# Patient Record
Sex: FEMALE | Race: BLACK OR AFRICAN AMERICAN | NOT HISPANIC OR LATINO | Employment: FULL TIME | URBAN - METROPOLITAN AREA
[De-identification: names, ages, dates, MRNs, and addresses within clinical notes are randomized per-mention and may not be internally consistent; named-entity substitution may affect disease eponyms.]

---

## 2017-07-13 ENCOUNTER — HISTORICAL (OUTPATIENT)
Dept: INTERNAL MEDICINE | Facility: CLINIC | Age: 47
End: 2017-07-13

## 2018-02-27 ENCOUNTER — HISTORICAL (OUTPATIENT)
Dept: INTERNAL MEDICINE | Facility: CLINIC | Age: 48
End: 2018-02-27

## 2018-02-27 LAB
BUN SERPL-MCNC: 10 MG/DL (ref 7–18)
CALCIUM SERPL-MCNC: 9.2 MG/DL (ref 8.5–10.1)
CHLORIDE SERPL-SCNC: 104 MMOL/L (ref 98–107)
CHOLEST SERPL-MCNC: 162 MG/DL
CHOLEST/HDLC SERPL: 3.4 {RATIO} (ref 0–4.4)
CO2 SERPL-SCNC: 29 MMOL/L (ref 21–32)
CREAT SERPL-MCNC: 0.9 MG/DL (ref 0.6–1.3)
DEPRECATED CALCIDIOL+CALCIFEROL SERPL-MC: 24.53 NG/ML (ref 30–80)
EST. AVERAGE GLUCOSE BLD GHB EST-MCNC: 126 MG/DL
GLUCOSE SERPL-MCNC: 107 MG/DL (ref 74–106)
HBA1C MFR BLD: 6 % (ref 4.2–6.3)
HDLC SERPL-MCNC: 48 MG/DL
LDLC SERPL CALC-MCNC: 101 MG/DL (ref 0–130)
POTASSIUM SERPL-SCNC: 3.8 MMOL/L (ref 3.5–5.1)
SODIUM SERPL-SCNC: 140 MMOL/L (ref 136–145)
TRIGL SERPL-MCNC: 66 MG/DL
VLDLC SERPL CALC-MCNC: 13 MG/DL

## 2019-02-19 ENCOUNTER — HISTORICAL (OUTPATIENT)
Dept: RADIOLOGY | Facility: HOSPITAL | Age: 49
End: 2019-02-19

## 2020-08-07 ENCOUNTER — HISTORICAL (OUTPATIENT)
Dept: RADIOLOGY | Facility: HOSPITAL | Age: 50
End: 2020-08-07

## 2020-08-07 LAB
DEPRECATED CALCIDIOL+CALCIFEROL SERPL-MC: 32.2 NG/ML (ref 6.6–49.9)
ERYTHROCYTE [DISTWIDTH] IN BLOOD BY AUTOMATED COUNT: 15.7 % (ref 11.5–17)
EST. AVERAGE GLUCOSE BLD GHB EST-MCNC: 114 MG/DL
ESTRADIOL SERPL HS-MCNC: 42 PG/ML
FSH SERPL-ACNC: 5.3 MIU/ML
GLUCOSE SERPL-MCNC: 91 MG/DL (ref 74–100)
HBA1C MFR BLD: 5.6 %
HCT VFR BLD AUTO: 39.5 % (ref 37–47)
HGB BLD-MCNC: 12.2 GM/DL (ref 12–16)
IRON SERPL-MCNC: 38 UG/DL (ref 50–170)
MCH RBC QN AUTO: 24.3 PG (ref 27–31)
MCHC RBC AUTO-ENTMCNC: 30.9 GM/DL (ref 33–36)
MCV RBC AUTO: 78.5 FL (ref 80–94)
PLATELET # BLD AUTO: 289 X10(3)/MCL (ref 130–400)
PMV BLD AUTO: 11.5 FL (ref 9.4–12.4)
PROLACTIN LEVEL (OHS): 7.6 NG/ML (ref 5.18–26.53)
RBC # BLD AUTO: 5.03 X10(6)/MCL (ref 4.2–5.4)
TSH SERPL-ACNC: 2.07 UIU/ML (ref 0.35–4.94)
WBC # SPEC AUTO: 5.5 X10(3)/MCL (ref 4.5–11.5)

## 2021-01-04 ENCOUNTER — HISTORICAL (OUTPATIENT)
Dept: ADMINISTRATIVE | Facility: HOSPITAL | Age: 51
End: 2021-01-04

## 2021-01-04 LAB
ABS NEUT (OLG): 2.99 X10(3)/MCL (ref 2.1–9.2)
APPEARANCE, UA: CLEAR
BACTERIA SPEC CULT: NORMAL /HPF
BASOPHILS # BLD AUTO: 0 X10(3)/MCL (ref 0–0.2)
BASOPHILS NFR BLD AUTO: 0 %
BILIRUB UR QL STRIP: NEGATIVE
CHOLEST SERPL-MCNC: 209 MG/DL
CHOLEST/HDLC SERPL: 4 {RATIO} (ref 0–5)
COLOR UR: YELLOW
DEPRECATED CALCIDIOL+CALCIFEROL SERPL-MC: 40.2 NG/ML (ref 30–80)
EOSINOPHIL # BLD AUTO: 0.2 X10(3)/MCL (ref 0–0.9)
EOSINOPHIL NFR BLD AUTO: 4 %
ERYTHROCYTE [DISTWIDTH] IN BLOOD BY AUTOMATED COUNT: 17.5 % (ref 11.5–17)
EST. AVERAGE GLUCOSE BLD GHB EST-MCNC: 116.9 MG/DL
GLUCOSE (UA): NEGATIVE
HBA1C MFR BLD: 5.7 %
HCT VFR BLD AUTO: 43.3 % (ref 37–47)
HDLC SERPL-MCNC: 52 MG/DL (ref 35–60)
HGB BLD-MCNC: 12.9 GM/DL (ref 12–16)
HGB UR QL STRIP: NEGATIVE
IRON SERPL-MCNC: 69 UG/DL (ref 50–170)
KETONES UR QL STRIP: NEGATIVE
LDLC SERPL CALC-MCNC: 138 MG/DL (ref 50–140)
LEUKOCYTE ESTERASE UR QL STRIP: NEGATIVE
LYMPHOCYTES # BLD AUTO: 1.7 X10(3)/MCL (ref 0.6–4.6)
LYMPHOCYTES NFR BLD AUTO: 30 %
MCH RBC QN AUTO: 23.8 PG (ref 27–31)
MCHC RBC AUTO-ENTMCNC: 29.8 GM/DL (ref 33–36)
MCV RBC AUTO: 80 FL (ref 80–94)
MONOCYTES # BLD AUTO: 0.5 X10(3)/MCL (ref 0.1–1.3)
MONOCYTES NFR BLD AUTO: 10 %
NEUTROPHILS # BLD AUTO: 2.99 X10(3)/MCL (ref 2.1–9.2)
NEUTROPHILS NFR BLD AUTO: 55 %
NITRITE UR QL STRIP: NEGATIVE
PH UR STRIP: 5.5 [PH] (ref 5–9)
PLATELET # BLD AUTO: 281 X10(3)/MCL (ref 130–400)
PMV BLD AUTO: 11.7 FL (ref 9.4–12.4)
PROT UR QL STRIP: NEGATIVE
RBC # BLD AUTO: 5.41 X10(6)/MCL (ref 4.2–5.4)
RBC #/AREA URNS HPF: NORMAL /HPF (ref 0–2)
SP GR UR STRIP: 1.02 (ref 1–1.03)
SQUAMOUS EPITHELIAL, UA: NORMAL /HPF (ref 0–4)
TRIGL SERPL-MCNC: 96 MG/DL (ref 37–140)
TSH SERPL-ACNC: 3.52 UIU/ML (ref 0.35–4.94)
UROBILINOGEN UR STRIP-ACNC: 0.2
VLDLC SERPL CALC-MCNC: 19 MG/DL
WBC # SPEC AUTO: 5.5 X10(3)/MCL (ref 4.5–11.5)
WBC #/AREA URNS HPF: NORMAL /HPF (ref 0–3)

## 2021-06-29 ENCOUNTER — HISTORICAL (OUTPATIENT)
Dept: ADMINISTRATIVE | Facility: HOSPITAL | Age: 51
End: 2021-06-29

## 2021-06-29 LAB
ABS NEUT (OLG): 3.23 X10(3)/MCL (ref 2.1–9.2)
ALBUMIN SERPL-MCNC: 3 GM/DL (ref 3.5–5)
ALBUMIN/GLOB SERPL: 0.8 RATIO (ref 1.1–2)
ALP SERPL-CCNC: 98 UNIT/L (ref 40–150)
ALT SERPL-CCNC: 12 UNIT/L (ref 0–55)
AST SERPL-CCNC: 11 UNIT/L (ref 5–34)
BASOPHILS # BLD AUTO: 0 X10(3)/MCL (ref 0–0.2)
BASOPHILS NFR BLD AUTO: 0 %
BILIRUB SERPL-MCNC: 0.5 MG/DL
BILIRUBIN DIRECT+TOT PNL SERPL-MCNC: 0.2 MG/DL (ref 0–0.5)
BILIRUBIN DIRECT+TOT PNL SERPL-MCNC: 0.3 MG/DL (ref 0–0.8)
BUN SERPL-MCNC: 10.4 MG/DL (ref 7–18.7)
CALCIUM SERPL-MCNC: 9.4 MG/DL (ref 8.4–10.2)
CHLORIDE SERPL-SCNC: 102 MMOL/L (ref 98–107)
CHOLEST SERPL-MCNC: 161 MG/DL
CHOLEST/HDLC SERPL: 4 {RATIO} (ref 0–5)
CO2 SERPL-SCNC: 29 MMOL/L (ref 22–29)
CREAT SERPL-MCNC: 0.8 MG/DL (ref 0.55–1.02)
EOSINOPHIL # BLD AUTO: 0.1 X10(3)/MCL (ref 0–0.9)
EOSINOPHIL NFR BLD AUTO: 1 %
ERYTHROCYTE [DISTWIDTH] IN BLOOD BY AUTOMATED COUNT: 15.7 % (ref 11.5–17)
GLOBULIN SER-MCNC: 3.8 GM/DL (ref 2.4–3.5)
GLUCOSE SERPL-MCNC: 92 MG/DL (ref 74–100)
HCT VFR BLD AUTO: 40.4 % (ref 37–47)
HDLC SERPL-MCNC: 43 MG/DL (ref 35–60)
HGB BLD-MCNC: 12.8 GM/DL (ref 12–16)
LDLC SERPL CALC-MCNC: 106 MG/DL (ref 50–140)
LYMPHOCYTES # BLD AUTO: 1.9 X10(3)/MCL (ref 0.6–4.6)
LYMPHOCYTES NFR BLD AUTO: 33 %
MCH RBC QN AUTO: 24.8 PG (ref 27–31)
MCHC RBC AUTO-ENTMCNC: 31.7 GM/DL (ref 33–36)
MCV RBC AUTO: 78.3 FL (ref 80–94)
MONOCYTES # BLD AUTO: 0.5 X10(3)/MCL (ref 0.1–1.3)
MONOCYTES NFR BLD AUTO: 9 %
NEUTROPHILS # BLD AUTO: 3.23 X10(3)/MCL (ref 2.1–9.2)
NEUTROPHILS NFR BLD AUTO: 56 %
PLATELET # BLD AUTO: 296 X10(3)/MCL (ref 130–400)
PMV BLD AUTO: 11 FL (ref 9.4–12.4)
POTASSIUM SERPL-SCNC: 4 MMOL/L (ref 3.5–5.1)
PROT SERPL-MCNC: 6.8 GM/DL (ref 6.4–8.3)
RBC # BLD AUTO: 5.16 X10(6)/MCL (ref 4.2–5.4)
SODIUM SERPL-SCNC: 137 MMOL/L (ref 136–145)
TRIGL SERPL-MCNC: 62 MG/DL (ref 37–140)
VLDLC SERPL CALC-MCNC: 12 MG/DL
WBC # SPEC AUTO: 5.7 X10(3)/MCL (ref 4.5–11.5)

## 2021-10-05 ENCOUNTER — HISTORICAL (OUTPATIENT)
Dept: ADMINISTRATIVE | Facility: HOSPITAL | Age: 51
End: 2021-10-05

## 2021-10-05 LAB
DEPRECATED CALCIDIOL+CALCIFEROL SERPL-MC: 33.3 NG/ML (ref 30–80)
ERYTHROCYTE [DISTWIDTH] IN BLOOD BY AUTOMATED COUNT: 15.8 % (ref 11.5–17)
HCT VFR BLD AUTO: 36.8 % (ref 37–47)
HGB BLD-MCNC: 11.3 GM/DL (ref 12–16)
IRON SERPL-MCNC: 26 UG/DL (ref 50–170)
MCH RBC QN AUTO: 23.7 PG (ref 27–31)
MCHC RBC AUTO-ENTMCNC: 30.7 GM/DL (ref 33–36)
MCV RBC AUTO: 77.3 FL (ref 80–94)
PLATELET # BLD AUTO: 302 X10(3)/MCL (ref 130–400)
PMV BLD AUTO: 11.7 FL (ref 9.4–12.4)
RBC # BLD AUTO: 4.76 X10(6)/MCL (ref 4.2–5.4)
TSH SERPL-ACNC: 3.96 UIU/ML (ref 0.35–4.94)
WBC # SPEC AUTO: 7 X10(3)/MCL (ref 4.5–11.5)

## 2022-04-07 ENCOUNTER — HISTORICAL (OUTPATIENT)
Dept: ADMINISTRATIVE | Facility: HOSPITAL | Age: 52
End: 2022-04-07

## 2022-04-07 LAB
ABS NEUT (OLG): 2.96 (ref 2.1–9.2)
ALBUMIN SERPL-MCNC: 3.2 G/DL (ref 3.5–5)
ALBUMIN/GLOB SERPL: 1.1 {RATIO} (ref 1.1–2)
ALP SERPL-CCNC: 93 U/L (ref 40–150)
ALT SERPL-CCNC: 10 U/L (ref 0–55)
APPEARANCE, UA: CLEAR
AST SERPL-CCNC: 11 U/L (ref 5–34)
BACTERIA SPEC CULT: NORMAL
BASOPHILS # BLD AUTO: 0 10*3/UL (ref 0–0.2)
BASOPHILS NFR BLD AUTO: 1 %
BILIRUB SERPL-MCNC: 0.4 MG/DL
BILIRUB UR QL STRIP: NEGATIVE
BILIRUBIN DIRECT+TOT PNL SERPL-MCNC: 0.2 (ref 0–0.5)
BILIRUBIN DIRECT+TOT PNL SERPL-MCNC: 0.2 (ref 0–0.8)
BUN SERPL-MCNC: 9 MG/DL (ref 7–18.7)
CALCIUM SERPL-MCNC: 9.1 MG/DL (ref 8.7–10.5)
CHLORIDE SERPL-SCNC: 106 MMOL/L (ref 98–107)
CHOLEST SERPL-MCNC: 169 MG/DL
CHOLEST/HDLC SERPL: 4 {RATIO} (ref 0–5)
CO2 SERPL-SCNC: 22 MMOL/L (ref 22–29)
COLOR UR: YELLOW
CREAT SERPL-MCNC: 0.78 MG/DL (ref 0.55–1.02)
DEPRECATED CALCIDIOL+CALCIFEROL SERPL-MC: 30.4 NG/ML (ref 30–80)
EOSINOPHIL # BLD AUTO: 0.1 10*3/UL (ref 0–0.9)
EOSINOPHIL NFR BLD AUTO: 2 %
ERYTHROCYTE [DISTWIDTH] IN BLOOD BY AUTOMATED COUNT: 16.6 % (ref 11.5–17)
EST. AVERAGE GLUCOSE BLD GHB EST-MCNC: 111.2 MG/DL
GLOBULIN SER-MCNC: 3 G/DL (ref 2.4–3.5)
GLUCOSE (UA): NEGATIVE
GLUCOSE SERPL-MCNC: 91 MG/DL (ref 74–100)
HBA1C MFR BLD: 5.5 %
HCT VFR BLD AUTO: 36.2 % (ref 37–47)
HDLC SERPL-MCNC: 38 MG/DL (ref 35–60)
HEMOLYSIS INTERF INDEX SERPL-ACNC: 5
HGB BLD-MCNC: 11.3 G/DL (ref 12–16)
HGB UR QL STRIP: NEGATIVE
ICTERIC INTERF INDEX SERPL-ACNC: 0
KETONES UR QL STRIP: NEGATIVE
LDLC SERPL CALC-MCNC: 114 MG/DL (ref 50–140)
LEUKOCYTE ESTERASE UR QL STRIP: NEGATIVE
LIPEMIC INTERF INDEX SERPL-ACNC: <0
LYMPHOCYTES # BLD AUTO: 1.7 10*3/UL (ref 0.6–4.6)
LYMPHOCYTES NFR BLD AUTO: 32 %
MANUAL DIFF? (OHS): NO
MCH RBC QN AUTO: 23.8 PG (ref 27–31)
MCHC RBC AUTO-ENTMCNC: 31.2 G/DL (ref 33–36)
MCV RBC AUTO: 76.4 FL (ref 80–94)
MONOCYTES # BLD AUTO: 0.6 10*3/UL (ref 0.1–1.3)
MONOCYTES NFR BLD AUTO: 10 %
NEUTROPHILS # BLD AUTO: 2.96 10*3/UL (ref 2.1–9.2)
NEUTROPHILS NFR BLD AUTO: 55 %
NITRITE UR QL STRIP: NEGATIVE
PH UR STRIP: 6.5 [PH] (ref 5–9)
PLATELET # BLD AUTO: 251 10*3/UL (ref 130–400)
PMV BLD AUTO: 12.3 FL (ref 9.4–12.4)
POTASSIUM SERPL-SCNC: 4.2 MMOL/L (ref 3.5–5.1)
PROT SERPL-MCNC: 6.2 G/DL (ref 6.4–8.3)
PROT UR QL STRIP: NEGATIVE
RBC # BLD AUTO: 4.74 10*6/UL (ref 4.2–5.4)
RBC #/AREA URNS HPF: NORMAL /[HPF] (ref 0–2)
SODIUM SERPL-SCNC: 139 MMOL/L (ref 136–145)
SP GR UR STRIP: 1.01 (ref 1–1.03)
SQUAMOUS EPITHELIAL, UA: NORMAL (ref 0–4)
TRIGL SERPL-MCNC: 83 MG/DL (ref 37–140)
TSH SERPL-ACNC: 3.02 M[IU]/L (ref 0.35–4.94)
UA WBC MAN: NORMAL (ref 0–2)
UROBILINOGEN UR STRIP-ACNC: 0.2
VLDLC SERPL CALC-MCNC: 17 MG/DL
WBC # SPEC AUTO: 5.3 10*3/UL (ref 4.5–11.5)

## 2022-04-10 ENCOUNTER — HISTORICAL (OUTPATIENT)
Dept: ADMINISTRATIVE | Facility: HOSPITAL | Age: 52
End: 2022-04-10

## 2022-04-25 VITALS
SYSTOLIC BLOOD PRESSURE: 126 MMHG | HEIGHT: 67 IN | WEIGHT: 293 LBS | OXYGEN SATURATION: 98 % | BODY MASS INDEX: 45.99 KG/M2 | DIASTOLIC BLOOD PRESSURE: 70 MMHG

## 2022-06-03 LAB — NONINV COLON CA DNA+OCC BLD SCRN STL QL: NEGATIVE

## 2023-01-20 ENCOUNTER — TELEPHONE (OUTPATIENT)
Dept: FAMILY MEDICINE | Facility: CLINIC | Age: 53
End: 2023-01-20
Payer: COMMERCIAL

## 2023-01-20 DIAGNOSIS — I10 BENIGN ESSENTIAL HTN: Primary | ICD-10-CM

## 2023-01-20 RX ORDER — HYDROCHLOROTHIAZIDE 25 MG/1
25 TABLET ORAL DAILY
COMMUNITY
Start: 2022-12-22 | End: 2023-01-20 | Stop reason: SDUPTHER

## 2023-01-20 RX ORDER — HYDROCHLOROTHIAZIDE 25 MG/1
25 TABLET ORAL DAILY
Qty: 90 TABLET | Refills: 3 | Status: SHIPPED | OUTPATIENT
Start: 2023-01-20 | End: 2023-01-23 | Stop reason: SDUPTHER

## 2023-01-20 NOTE — TELEPHONE ENCOUNTER
----- Message from Yola Berry sent at 1/20/2023  7:53 AM CST -----  Regarding: Med refill  [facility] pharmacy is requesting a refill on Hydrochlorothiazide 25mg tablets. Qty. 90. Take 1 tablet by mouth daily.

## 2023-01-20 NOTE — TELEPHONE ENCOUNTER
----- Message from Yola Berry sent at 1/20/2023  7:53 AM CST -----  Regarding: Med refill  [facility] pharmacy is requesting a refill on Hydrochlorothiazide 25mg tablets. Qty. 90. Take 1 tablet by mouth daily.

## 2023-01-23 DIAGNOSIS — I10 BENIGN ESSENTIAL HTN: ICD-10-CM

## 2023-01-23 RX ORDER — HYDROCHLOROTHIAZIDE 25 MG/1
25 TABLET ORAL DAILY
Qty: 30 TABLET | Refills: 0 | Status: SHIPPED | OUTPATIENT
Start: 2023-01-23 | End: 2023-06-19 | Stop reason: SDUPTHER

## 2023-05-04 RX ORDER — FLUTICASONE PROPIONATE 50 UG/1
50 POWDER, METERED RESPIRATORY (INHALATION) 2 TIMES DAILY
Qty: 16 EACH | Refills: 5 | Status: SHIPPED | OUTPATIENT
Start: 2023-05-04 | End: 2023-05-08 | Stop reason: SDUPTHER

## 2023-05-04 RX ORDER — FLUTICASONE PROPIONATE 50 UG/1
50 POWDER, METERED RESPIRATORY (INHALATION) 2 TIMES DAILY
COMMUNITY
End: 2023-05-04 | Stop reason: SDUPTHER

## 2023-05-08 ENCOUNTER — PATIENT MESSAGE (OUTPATIENT)
Dept: FAMILY MEDICINE | Facility: CLINIC | Age: 53
End: 2023-05-08
Payer: COMMERCIAL

## 2023-05-08 DIAGNOSIS — T78.40XA ALLERGY, INITIAL ENCOUNTER: Primary | ICD-10-CM

## 2023-05-08 RX ORDER — FLUTICASONE PROPIONATE 50 UG/1
50 POWDER, METERED RESPIRATORY (INHALATION) 2 TIMES DAILY
Qty: 16 EACH | Refills: 5 | Status: SHIPPED | OUTPATIENT
Start: 2023-05-08 | End: 2023-06-06

## 2023-05-08 RX ORDER — FLUTICASONE PROPIONATE 50 MCG
1 SPRAY, SUSPENSION (ML) NASAL 2 TIMES DAILY
COMMUNITY
Start: 2023-04-03 | End: 2023-05-08 | Stop reason: SDUPTHER

## 2023-05-08 RX ORDER — FLUTICASONE PROPIONATE 50 MCG
1 SPRAY, SUSPENSION (ML) NASAL 2 TIMES DAILY
Qty: 16 G | Refills: 11 | Status: SHIPPED | OUTPATIENT
Start: 2023-05-08

## 2023-05-08 NOTE — TELEPHONE ENCOUNTER
----- Message from Trevon Saucedo sent at 5/8/2023 10:09 AM CDT -----  .Type:  Needs Medical Advice    Who Called:  Yudith at [facility]   Symptoms (please be specific):    How long has patient had these symptoms:    Pharmacy name and phone #:    Would the patient rather a call back or a response via [facility]?   Best Call Back Number: [phone]  Additional Information: She has some questions about a script for this patient that was sent to them.

## 2023-05-30 ENCOUNTER — TELEPHONE (OUTPATIENT)
Dept: FAMILY MEDICINE | Facility: CLINIC | Age: 53
End: 2023-05-30
Payer: COMMERCIAL

## 2023-05-30 DIAGNOSIS — Z00.00 WELLNESS EXAMINATION: Primary | ICD-10-CM

## 2023-05-30 NOTE — TELEPHONE ENCOUNTER
----- Message from Nikkie Mcknight sent at 5/30/2023  9:51 AM CDT -----  .Type:  Needs Medical Advice    Who Called: [t  Would the patient rather a call back or a response via [facility]? cb  Best Call Back Number: [phone]  Additional Information: pt asking for her labs to be sent to [facility] please let her know once in

## 2023-06-02 ENCOUNTER — LAB VISIT (OUTPATIENT)
Dept: LAB | Facility: HOSPITAL | Age: 53
End: 2023-06-02
Attending: FAMILY MEDICINE
Payer: COMMERCIAL

## 2023-06-02 DIAGNOSIS — Z00.00 WELLNESS EXAMINATION: ICD-10-CM

## 2023-06-02 LAB
ALBUMIN SERPL-MCNC: 3.3 G/DL (ref 3.5–5)
ALBUMIN/GLOB SERPL: 1 RATIO (ref 1.1–2)
ALP SERPL-CCNC: 92 UNIT/L (ref 40–150)
ALT SERPL-CCNC: 14 UNIT/L (ref 0–55)
APPEARANCE UR: CLEAR
AST SERPL-CCNC: 14 UNIT/L (ref 5–34)
BACTERIA #/AREA URNS AUTO: NORMAL /HPF
BASOPHILS # BLD AUTO: 0.04 X10(3)/MCL
BASOPHILS NFR BLD AUTO: 0.6 %
BILIRUB UR QL STRIP.AUTO: NEGATIVE MG/DL
BILIRUBIN DIRECT+TOT PNL SERPL-MCNC: 0.4 MG/DL
BUN SERPL-MCNC: 14.5 MG/DL (ref 9.8–20.1)
CALCIUM SERPL-MCNC: 9.3 MG/DL (ref 8.4–10.2)
CHLORIDE SERPL-SCNC: 101 MMOL/L (ref 98–107)
CHOLEST SERPL-MCNC: 170 MG/DL
CHOLEST/HDLC SERPL: 4 {RATIO} (ref 0–5)
CO2 SERPL-SCNC: 25 MMOL/L (ref 22–29)
COLOR UR: YELLOW
CREAT SERPL-MCNC: 0.84 MG/DL (ref 0.55–1.02)
EOSINOPHIL # BLD AUTO: 0.08 X10(3)/MCL (ref 0–0.9)
EOSINOPHIL NFR BLD AUTO: 1.2 %
ERYTHROCYTE [DISTWIDTH] IN BLOOD BY AUTOMATED COUNT: 16.3 % (ref 11.5–17)
EST. AVERAGE GLUCOSE BLD GHB EST-MCNC: 116.9 MG/DL
GFR SERPLBLD CREATININE-BSD FMLA CKD-EPI: >60 MLS/MIN/1.73/M2
GLOBULIN SER-MCNC: 3.3 GM/DL (ref 2.4–3.5)
GLUCOSE SERPL-MCNC: 105 MG/DL (ref 74–100)
GLUCOSE UR QL STRIP.AUTO: NEGATIVE MG/DL
HBA1C MFR BLD: 5.7 %
HCT VFR BLD AUTO: 39.7 % (ref 37–47)
HCV AB SERPL QL IA: NONREACTIVE
HDLC SERPL-MCNC: 46 MG/DL (ref 35–60)
HGB BLD-MCNC: 12.7 G/DL (ref 12–16)
HIV 1+2 AB+HIV1 P24 AG SERPL QL IA: NONREACTIVE
IMM GRANULOCYTES # BLD AUTO: 0.04 X10(3)/MCL (ref 0–0.04)
IMM GRANULOCYTES NFR BLD AUTO: 0.6 %
KETONES UR QL STRIP.AUTO: NEGATIVE MG/DL
LDLC SERPL CALC-MCNC: 107 MG/DL (ref 50–140)
LEUKOCYTE ESTERASE UR QL STRIP.AUTO: NEGATIVE UNIT/L
LYMPHOCYTES # BLD AUTO: 2.11 X10(3)/MCL (ref 0.6–4.6)
LYMPHOCYTES NFR BLD AUTO: 32.8 %
MCH RBC QN AUTO: 25 PG (ref 27–31)
MCHC RBC AUTO-ENTMCNC: 32 G/DL (ref 33–36)
MCV RBC AUTO: 78.3 FL (ref 80–94)
MONOCYTES # BLD AUTO: 0.57 X10(3)/MCL (ref 0.1–1.3)
MONOCYTES NFR BLD AUTO: 8.9 %
NEUTROPHILS # BLD AUTO: 3.59 X10(3)/MCL (ref 2.1–9.2)
NEUTROPHILS NFR BLD AUTO: 55.9 %
NITRITE UR QL STRIP.AUTO: NEGATIVE
NRBC BLD AUTO-RTO: 0 %
PH UR STRIP.AUTO: 7.5 [PH]
PLATELET # BLD AUTO: 291 X10(3)/MCL (ref 130–400)
PMV BLD AUTO: 11.2 FL (ref 7.4–10.4)
POTASSIUM SERPL-SCNC: 4.1 MMOL/L (ref 3.5–5.1)
PROT SERPL-MCNC: 6.6 GM/DL (ref 6.4–8.3)
PROT UR QL STRIP.AUTO: NEGATIVE MG/DL
RBC # BLD AUTO: 5.07 X10(6)/MCL (ref 4.2–5.4)
RBC #/AREA URNS AUTO: <5 /HPF
RBC UR QL AUTO: NEGATIVE UNIT/L
SODIUM SERPL-SCNC: 136 MMOL/L (ref 136–145)
SP GR UR STRIP.AUTO: 1.01 (ref 1–1.03)
SQUAMOUS #/AREA URNS AUTO: <5 /HPF
TRIGL SERPL-MCNC: 83 MG/DL (ref 37–140)
TSH SERPL-ACNC: 3.94 UIU/ML (ref 0.35–4.94)
UROBILINOGEN UR STRIP-ACNC: 0.2 MG/DL
VLDLC SERPL CALC-MCNC: 17 MG/DL
WBC # SPEC AUTO: 6.43 X10(3)/MCL (ref 4.5–11.5)
WBC #/AREA URNS AUTO: <5 /HPF

## 2023-06-02 PROCEDURE — 36415 COLL VENOUS BLD VENIPUNCTURE: CPT

## 2023-06-02 PROCEDURE — 80061 LIPID PANEL: CPT

## 2023-06-02 PROCEDURE — 85025 COMPLETE CBC W/AUTO DIFF WBC: CPT

## 2023-06-02 PROCEDURE — 87389 HIV-1 AG W/HIV-1&-2 AB AG IA: CPT

## 2023-06-02 PROCEDURE — 83036 HEMOGLOBIN GLYCOSYLATED A1C: CPT

## 2023-06-02 PROCEDURE — 80053 COMPREHEN METABOLIC PANEL: CPT

## 2023-06-02 PROCEDURE — 81001 URINALYSIS AUTO W/SCOPE: CPT

## 2023-06-02 PROCEDURE — 86803 HEPATITIS C AB TEST: CPT

## 2023-06-02 PROCEDURE — 84443 ASSAY THYROID STIM HORMONE: CPT

## 2023-06-06 ENCOUNTER — OFFICE VISIT (OUTPATIENT)
Dept: FAMILY MEDICINE | Facility: CLINIC | Age: 53
End: 2023-06-06
Payer: COMMERCIAL

## 2023-06-06 VITALS
OXYGEN SATURATION: 97 % | WEIGHT: 293 LBS | HEIGHT: 67 IN | DIASTOLIC BLOOD PRESSURE: 84 MMHG | RESPIRATION RATE: 17 BRPM | BODY MASS INDEX: 45.99 KG/M2 | HEART RATE: 91 BPM | SYSTOLIC BLOOD PRESSURE: 136 MMHG

## 2023-06-06 DIAGNOSIS — E66.01 CLASS 3 SEVERE OBESITY DUE TO EXCESS CALORIES WITH SERIOUS COMORBIDITY AND BODY MASS INDEX (BMI) OF 50.0 TO 59.9 IN ADULT: Primary | ICD-10-CM

## 2023-06-06 DIAGNOSIS — Z00.00 WELLNESS EXAMINATION: Primary | ICD-10-CM

## 2023-06-06 DIAGNOSIS — I10 PRIMARY HYPERTENSION: ICD-10-CM

## 2023-06-06 DIAGNOSIS — E66.01 CLASS 3 SEVERE OBESITY DUE TO EXCESS CALORIES WITH SERIOUS COMORBIDITY AND BODY MASS INDEX (BMI) OF 50.0 TO 59.9 IN ADULT: ICD-10-CM

## 2023-06-06 DIAGNOSIS — Z23 NEED FOR DIPHTHERIA-TETANUS-PERTUSSIS (TDAP) VACCINE: ICD-10-CM

## 2023-06-06 DIAGNOSIS — R73.03 PREDIABETES: ICD-10-CM

## 2023-06-06 PROCEDURE — 1159F MED LIST DOCD IN RCRD: CPT | Mod: CPTII,,, | Performed by: FAMILY MEDICINE

## 2023-06-06 PROCEDURE — 99213 PR OFFICE/OUTPT VISIT, EST, LEVL III, 20-29 MIN: ICD-10-PCS | Mod: 25,,, | Performed by: FAMILY MEDICINE

## 2023-06-06 PROCEDURE — 3079F DIAST BP 80-89 MM HG: CPT | Mod: CPTII,,, | Performed by: FAMILY MEDICINE

## 2023-06-06 PROCEDURE — 3008F PR BODY MASS INDEX (BMI) DOCUMENTED: ICD-10-PCS | Mod: CPTII,,, | Performed by: FAMILY MEDICINE

## 2023-06-06 PROCEDURE — 99213 OFFICE O/P EST LOW 20 MIN: CPT | Mod: 25,,, | Performed by: FAMILY MEDICINE

## 2023-06-06 PROCEDURE — 3079F PR MOST RECENT DIASTOLIC BLOOD PRESSURE 80-89 MM HG: ICD-10-PCS | Mod: CPTII,,, | Performed by: FAMILY MEDICINE

## 2023-06-06 PROCEDURE — 90471 PR IMMUNIZ ADMIN,1 SINGLE/COMB VAC/TOXOID: ICD-10-PCS | Mod: ,,, | Performed by: FAMILY MEDICINE

## 2023-06-06 PROCEDURE — 1159F PR MEDICATION LIST DOCUMENTED IN MEDICAL RECORD: ICD-10-PCS | Mod: CPTII,,, | Performed by: FAMILY MEDICINE

## 2023-06-06 PROCEDURE — 90715 PR TDAP VACCINE >7 YO, IM: ICD-10-PCS | Mod: ,,, | Performed by: FAMILY MEDICINE

## 2023-06-06 PROCEDURE — 90715 TDAP VACCINE 7 YRS/> IM: CPT | Mod: ,,, | Performed by: FAMILY MEDICINE

## 2023-06-06 PROCEDURE — 4010F ACE/ARB THERAPY RXD/TAKEN: CPT | Mod: CPTII,,, | Performed by: FAMILY MEDICINE

## 2023-06-06 PROCEDURE — 3008F BODY MASS INDEX DOCD: CPT | Mod: CPTII,,, | Performed by: FAMILY MEDICINE

## 2023-06-06 PROCEDURE — 90471 IMMUNIZATION ADMIN: CPT | Mod: ,,, | Performed by: FAMILY MEDICINE

## 2023-06-06 PROCEDURE — 1160F RVW MEDS BY RX/DR IN RCRD: CPT | Mod: CPTII,,, | Performed by: FAMILY MEDICINE

## 2023-06-06 PROCEDURE — 3075F SYST BP GE 130 - 139MM HG: CPT | Mod: CPTII,,, | Performed by: FAMILY MEDICINE

## 2023-06-06 PROCEDURE — 99396 PR PREVENTIVE VISIT,EST,40-64: ICD-10-PCS | Mod: 25,,, | Performed by: FAMILY MEDICINE

## 2023-06-06 PROCEDURE — 3075F PR MOST RECENT SYSTOLIC BLOOD PRESS GE 130-139MM HG: ICD-10-PCS | Mod: CPTII,,, | Performed by: FAMILY MEDICINE

## 2023-06-06 PROCEDURE — 99396 PREV VISIT EST AGE 40-64: CPT | Mod: 25,,, | Performed by: FAMILY MEDICINE

## 2023-06-06 PROCEDURE — 4010F PR ACE/ARB THEARPY RXD/TAKEN: ICD-10-PCS | Mod: CPTII,,, | Performed by: FAMILY MEDICINE

## 2023-06-06 PROCEDURE — 1160F PR REVIEW ALL MEDS BY PRESCRIBER/CLIN PHARMACIST DOCUMENTED: ICD-10-PCS | Mod: CPTII,,, | Performed by: FAMILY MEDICINE

## 2023-06-06 RX ORDER — DICLOFENAC SODIUM 10 MG/G
4 GEL TOPICAL 2 TIMES DAILY
COMMUNITY
Start: 2023-05-30

## 2023-06-06 RX ORDER — ALBUTEROL SULFATE 90 UG/1
2 AEROSOL, METERED RESPIRATORY (INHALATION) EVERY 6 HOURS PRN
COMMUNITY
Start: 2023-04-03

## 2023-06-06 RX ORDER — DICLOFENAC SODIUM 50 MG/1
50 TABLET, DELAYED RELEASE ORAL 2 TIMES DAILY
COMMUNITY
Start: 2023-05-29

## 2023-06-06 RX ORDER — LISINOPRIL 10 MG/1
10 TABLET ORAL EVERY MORNING
COMMUNITY
Start: 2023-05-17 | End: 2023-06-19 | Stop reason: SDUPTHER

## 2023-06-06 RX ORDER — MAGNESIUM 30 MG
30 TABLET ORAL 2 TIMES DAILY
COMMUNITY

## 2023-06-06 RX ORDER — FERROUS SULFATE TAB 325 MG (65 MG ELEMENTAL FE) 325 (65 FE) MG
325 TAB ORAL EVERY OTHER DAY
COMMUNITY
Start: 2023-04-21

## 2023-06-06 RX ORDER — CHOLECALCIFEROL (VITAMIN D3) 50 MCG
2000 TABLET ORAL DAILY
COMMUNITY

## 2023-06-06 RX ORDER — SPIRONOLACTONE 50 MG/1
50 TABLET, FILM COATED ORAL 2 TIMES DAILY
COMMUNITY
Start: 2023-05-04

## 2023-06-06 NOTE — PROGRESS NOTES
Patient ID: [id number]     Chief Complaint: Annual Exam        HPI:     Dolly Romero is a 53 y.o. female here today for annual wellness exam.  Well Adult History   The patient presents for well adult exam. The patient's general health status is described as fair. The patient's diet is described as balanced. Exercise: occasional. Associated symptoms consist of denies weight loss, denies weight gain, denies fatigue, denies headache, denies hearing loss and denies vision changes. Last menstrual period: 06/05/2022, perimenopausal with GYN (Dr. Prado)). Additional pertinent history: last dental exam: goes every 6 months, last eye exam: has an appointment scheduled for this week, goes annually (wears eyeglasses), last pap smear : 07/27/2021 ([facility]- Dr. Benton), last mammogram: 09/09/2022 at [facility] ([facility]), she has negative Cologuard in 2022, needs our office to request the results, seat belt use, occasional caffeine use (soft drinks), tobacco use none, social alcohol use and She had labs done on 06/02/2023, here to discuss the results today. She needs Tdap, but she is UTD on all other vaccines. HTN is well controlled with Rx, no side effects, asymptomatic,  BP at home is 130's/80's when she has her Rx, she denies chest pain, palpitations, or edema. She does see Cardiology (Dr. Ochoa) as scheduled. She has ALEM, stable with CPAP, compliant with nightly CPAP with control of symptoms. She reports that asthma is well controlled and she hasn't had an asthma attack in over 1 year. She denies SOB, wheezing, or hemoptysis. She is morbidly obese and she refuses weight loss surgery, she would like to see a dietician. She has pre-diabetes, would like to try Mounjaro to help with BG control and obesity. She denies family history of MEN II or medullary thyroid cancer or personal history of pancreatitis, she is not trying to get pregnant. She has joint pain, stable, followed by PMR (Dr. Ackerman), awaiting  results from arthritis panel.   - Patient is without any other complaints today.    Advance Care Planning     Date: 06/06/2023  Patient did not wish or was not able to name a surrogate decision maker or provide an Advance Care Plan.        No past medical history on file.     History reviewed. No pertinent surgical history.    Review of patient's allergies indicates:  No Known Allergies    Outpatient Medications Marked as Taking for the 6/6/23 encounter (Office Visit) with Iliana Pelletier MD   Medication Sig Dispense Refill    albuterol (PROVENTIL/VENTOLIN HFA) 90 mcg/actuation inhaler Inhale 2 puffs into the lungs every 6 (six) hours as needed.      aspirin 81 mg Cap Take 1 tablet by mouth once daily.      cholecalciferol, vitamin D3, (VITAMIN D3) 50 mcg (2,000 unit) Tab Take 2,000 Units by mouth once daily.      diclofenac (VOLTAREN) 50 MG EC tablet Take 50 mg by mouth 2 (two) times a day.      diclofenac sodium (VOLTAREN) 1 % Gel Apply 4 g topically 2 (two) times a day.      FEROSUL 325 mg (65 mg iron) Tab tablet Take 325 mg by mouth every other day.      fluticasone propionate (FLONASE) 50 mcg/actuation nasal spray 1 spray (50 mcg total) by Each Nostril route 2 (two) times a day. 16 g 11    hydroCHLOROthiazide (HYDRODIURIL) 25 MG tablet Take 1 tablet (25 mg total) by mouth once daily. 30 tablet 0    lisinopriL 10 MG tablet Take 10 mg by mouth every morning.      magnesium 30 mg Tab Take 30 mg by mouth 2 (two) times a day.      spironolactone (ALDACTONE) 50 MG tablet Take 50 mg by mouth 2 (two) times daily.       Current Facility-Administered Medications for the 6/6/23 encounter (Office Visit) with Iliana Pelletier MD   Medication Dose Route Frequency Provider Last Rate Last Admin    Tdap (BOOSTRIX) vaccine injection 0.5 mL  0.5 mL Intramuscular 1 time in Clinic/HOD Iliana Pelletier MD           Social History     Socioeconomic History    Marital status: Single   Tobacco Use    Smoking status:  Never    Smokeless tobacco: Never   Substance and Sexual Activity    Alcohol use: Not Currently    Drug use: Never    Sexual activity: Yes     Partners: Male     Social Determinants of Health     Financial Resource Strain: Low Risk     Difficulty of Paying Living Expenses: Not hard at all   Food Insecurity: No Food Insecurity    Worried About Running Out of Food in the Last Year: Never true    Ran Out of Food in the Last Year: Never true   Transportation Needs: No Transportation Needs    Lack of Transportation (Medical): No    Lack of Transportation (Non-Medical): No   Physical Activity: Inactive    Days of Exercise per Week: 0 days    Minutes of Exercise per Session: 0 min   Stress: No Stress Concern Present    Feeling of Stress : Not at all   Social Connections: Moderately Isolated    Frequency of Communication with Friends and Family: More than three times a week    Frequency of Social Gatherings with Friends and Family: Once a week    Attends Druze Services: More than 4 times per year    Active Member of Clubs or Organizations: No    Attends Club or Organization Meetings: Never    Marital Status: Never    Housing Stability: Low Risk     Unable to Pay for Housing in the Last Year: No    Number of Places Lived in the Last Year: 1    Unstable Housing in the Last Year: No        History reviewed. No pertinent family history.     Subjective:       Review of Systems:    See HPI for details    Constitutional: No fatigue, Weight gain, No fever, No chills.   Eye: No blurring, No visual disturbances.   Ear/Nose/Mouth/Throat: No decreased hearing, No ear pain, No nasal congestion, No sore throat.   Respiratory: No cough, No shortness of breath, No sputum production, No hemoptysis, No wheezing.  Cardiovascular: No chest pain, No palpitations, No peripheral edema.   Breast: Both breasts, No lump/ mass, No pain.   Nipple discharge: None.   Gastrointestinal: No nausea, No vomiting, No diarrhea, No constipation, No  "abdominal pain.   Genitourinary: No dysuria, No hematuria.   Gynecologic: Negative except as documented in history of present illness.   Hematology/Lymphatics: No bruising tendency, No bleeding tendency, No swollen lymph glands.   Endocrine: No excessive thirst, No polyuria, No excessive hunger.   Musculoskeletal: No joint pain, No muscle pain, No decreased range of motion.   Integumentary: No rash, No pruritus.   Neurologic: No abnormal balance, No confusion, No headache.   Psychiatric: No sleeping problems, No anxiety, No depression, Not suicidal, No hallucinations.     All Other ROS: Negative except as stated in HPI.   Answers submitted by the patient for this visit:  Review of Systems Questionnaire (Submitted on 5/30/2023)  activity change: No  unexpected weight change: No  neck pain: No  hearing loss: No  rhinorrhea: No  trouble swallowing: No  eye discharge: No  visual disturbance: No  chest tightness: No  wheezing: No  chest pain: No  palpitations: No  blood in stool: No  constipation: No  vomiting: No  diarrhea: No  polydipsia: No  polyuria: No  difficulty urinating: No  hematuria: No  menstrual problem: No  dysuria: No  joint swelling: No  arthralgias: No  headaches: No  weakness: No  confusion: No  dysphoric mood: No      Objective:     /84 (BP Location: Right arm, Patient Position: Sitting, BP Method: Large (Manual))   Pulse 91   Resp 17   Ht 5' 7" (1.702 m)   Wt (!) 167.2 kg (368 lb 11.2 oz)   LMP 06/05/2023 (Exact Date)   SpO2 97%   BMI 57.75 kg/m²     Physical Exam    General: Alert and oriented, No acute distress.   Appearance: Morbidly obese.   Eye: Pupils are equal, round and reactive to light, Extraocular movements are intact, Normal conjunctiva.   HENT: Normocephalic, Tympanic membranes are clear, Normal hearing, Oral mucosa is moist, No pharyngeal erythema.   Throat: Pharynx ( Not edematous, No exudate ).   Neck: Supple, Non-tender, No carotid bruit, No lymphadenopathy, No " thyromegaly.   Respiratory: Lungs are clear to auscultation, Respirations are non-labored, Breath sounds are equal, Symmetrical chest wall expansion, No chest wall tenderness.   Cardiovascular: Normal rate, Regular rhythm, No murmur, Good pulses equal in all extremities, No edema.   Gastrointestinal: Soft, Non-tender, Non-distended, Normal bowel sounds, No organomegaly.   Genitourinary: No costovertebral angle tenderness.   Musculoskeletal: Normal range of motion, No tenderness, Normal gait.   Integumentary: Neck with acanthosis nigricans.   Neurologic: No focal deficits, Cranial Nerves II-XII are grossly intact.   Psychiatric: Cooperative, Appropriate mood & affect, Normal judgment, Non-suicidal.   Mood and affect: Calm.   Behavior: Relaxed.     *Lab results from 06/02/2023 were reviewed and discussed with patient and patient voices understanding.*    The 10-year ASCVD risk score (Jose Eduardo BRASWELL, et al., 2019) is: 2.9%    Values used to calculate the score:      Age: 53 years      Sex: Female      Is Non- : Yes      Diabetic: No      Tobacco smoker: No      Systolic Blood Pressure: 136 mmHg      Is BP treated: No      HDL Cholesterol: 46 mg/dL      Total Cholesterol: 170 mg/dL     Assessment:       ICD-10-CM ICD-9-CM   1. Wellness examination  Z00.00 V70.0   2. Need for diphtheria-tetanus-pertussis (Tdap) vaccine  Z23 V06.1   3. Primary hypertension  I10 401.9   4. Prediabetes  R73.03 790.29   5. Class 3 severe obesity due to excess calories with serious comorbidity and body mass index (BMI) of 50.0 to 59.9 in adult  E66.01 278.01    Z68.43 V85.43        Plan:     Problem List Items Addressed This Visit    None  Visit Diagnoses       Wellness examination    -  Primary    Need for diphtheria-tetanus-pertussis (Tdap) vaccine        Relevant Medications    Tdap (BOOSTRIX) vaccine injection 0.5 mL    Primary hypertension        Prediabetes        Relevant Medications    tirzepatide 2.5 mg/0.5 mL PnIj     Other Relevant Orders    Comprehensive Metabolic Panel    Hemoglobin A1C    Class 3 severe obesity due to excess calories with serious comorbidity and body mass index (BMI) of 50.0 to 59.9 in adult             1. Wellness examination  - Monthly breast self exam encouraged. Diet, exercise, and 10% weight loss encouraged. Keep appointment for dental exams x q6 months as scheduled. Keep appointment for annual eye exam as scheduled. Keep appointment with GYN for annual pap smear, MMG as scheduled. Continue followup with specialists as scheduled. Notify M.D. or ER if temp greater than 100.4, or any acute illness.      2. Need for diphtheria-tetanus-pertussis (Tdap) vaccine  - Tdap (BOOSTRIX) vaccine injection 0.5 mL IM x 1 today    3. Primary hypertension  - BP is well controlled, continue BP Rx as prescribed. Continue followup with Cardiology as scheduled. Keep daily BP log. Notify M.D. or ER if BP >170/100 or <90/60, chest pain, palpitations, headache, SOB, temp greater than 100.4, or any acute illness.   Continue  Low Sodium Diet (DASH Diet - Less than 2 grams of sodium per day).  Monitor blood pressure daily and log. Report consistent numbers greater than 140/90.  Smoking cessation encouraged to aid in BP reduction.  Maintain healthy weight with goal BMI <30. Exercise 30 minutes per day, 5 days per week.      4. Prediabetes  - tirzepatide 2.5 mg/0.5 mL PnIj; Inject 2.5 mg into the skin every 7 days.  Dispense: 4 pen; Refill: 1  - Comprehensive Metabolic Panel; Future in 12/2023  - Hemoglobin A1C; Future in 12/2023  - Diet. exercise, and 10% weight loss encouraged. Rx trial of Mounjaro with close monitoring to help with pre-diabetes and obesity. She agrees to avoid pregnancy with Mounjaro. Will titrate Rx Mounjaro as needed/tolerated until max dose achieved. Notify M.D. or ER if symptoms persist or worsen, adverse Rx side effects, temp greater than 100.4, or any acute illness.    Lab Results   Component Value Date     HGBA1C 5.7 06/02/2023      Continue   Follow ADA Diet. Avoid soda, simple sweets, and limit rice/pasta/breads/starches.  Maintain healthy weight with goal BMI <30.  Exercise 5 times per week for 30 minutes per day.    5. Class 3 severe obesity due to excess calories with serious comorbidity and body mass index (BMI) of 50.0 to 59.9 in adult  - Same as #4.   Body mass index is 57.75 kg/m².  Goal BMI <30.  Exercise 5 times a week for 30 minutes per day.  Avoid soda, simple sugars, excessive rice, potatoes or bread. Limit fast foods and fried foods.  Choose complex carbs in moderation (example: green vegetables, beans, oatmeal). Eat plenty of fresh fruits and vegetables with lean meats daily.  Do not skip meals. Eat a balanced portion size.  Avoid fad diets. Consider permanent healthy life style changes.        Dolly was seen today for annual exam.    Diagnoses and all orders for this visit:    Wellness examination    Need for diphtheria-tetanus-pertussis (Tdap) vaccine  -     Tdap (BOOSTRIX) vaccine injection 0.5 mL    Primary hypertension    Prediabetes  -     tirzepatide 2.5 mg/0.5 mL PnIj; Inject 2.5 mg into the skin every 7 days.  -     Comprehensive Metabolic Panel; Future  -     Hemoglobin A1C; Future    Class 3 severe obesity due to excess calories with serious comorbidity and body mass index (BMI) of 50.0 to 59.9 in adult          Medication List with Changes/Refills   New Medications    TIRZEPATIDE 2.5 MG/0.5 ML PNIJ    Inject 2.5 mg into the skin every 7 days.       Start Date: 6/6/2023  End Date: 8/5/2023   Current Medications    ALBUTEROL (PROVENTIL/VENTOLIN HFA) 90 MCG/ACTUATION INHALER    Inhale 2 puffs into the lungs every 6 (six) hours as needed.       Start Date: 4/3/2023  End Date: --    ASPIRIN 81 MG CAP    Take 1 tablet by mouth once daily.       Start Date: --        End Date: --    CHOLECALCIFEROL, VITAMIN D3, (VITAMIN D3) 50 MCG (2,000 UNIT) TAB    Take 2,000 Units by mouth once daily.        Start Date: --        End Date: --    DICLOFENAC (VOLTAREN) 50 MG EC TABLET    Take 50 mg by mouth 2 (two) times a day.       Start Date: 5/29/2023 End Date: --    DICLOFENAC SODIUM (VOLTAREN) 1 % GEL    Apply 4 g topically 2 (two) times a day.       Start Date: 5/30/2023 End Date: --    FEROSUL 325 MG (65 MG IRON) TAB TABLET    Take 325 mg by mouth every other day.       Start Date: 4/21/2023 End Date: --    FLUTICASONE PROPIONATE (FLONASE) 50 MCG/ACTUATION NASAL SPRAY    1 spray (50 mcg total) by Each Nostril route 2 (two) times a day.       Start Date: 5/8/2023  End Date: --    HYDROCHLOROTHIAZIDE (HYDRODIURIL) 25 MG TABLET    Take 1 tablet (25 mg total) by mouth once daily.       Start Date: 1/23/2023 End Date: --    LISINOPRIL 10 MG TABLET    Take 10 mg by mouth every morning.       Start Date: 5/17/2023 End Date: --    MAGNESIUM 30 MG TAB    Take 30 mg by mouth 2 (two) times a day.       Start Date: --        End Date: --    SPIRONOLACTONE (ALDACTONE) 50 MG TABLET    Take 50 mg by mouth 2 (two) times daily.       Start Date: 5/4/2023  End Date: --   Discontinued Medications    FLUTICASONE PROPIONATE (FLOVENT DISKUS) 50 MCG/ACTUATION DSDV    Inhale 50 mcg into the lungs 2 (two) times daily. Shake liquid and use 1 spray in each nostril twice daily as needed for allergy symptoms       Start Date: 5/8/2023  End Date: 6/6/2023          Follow up in about 4 weeks (around 7/4/2023) for Prediabetes Followup, Virtual Visit; *Cologuard results*.

## 2023-06-07 ENCOUNTER — TELEPHONE (OUTPATIENT)
Dept: FAMILY MEDICINE | Facility: CLINIC | Age: 53
End: 2023-06-07
Payer: COMMERCIAL

## 2023-06-07 DIAGNOSIS — R73.03 PREDIABETES: ICD-10-CM

## 2023-06-07 NOTE — TELEPHONE ENCOUNTER
----- Message from Trevon Saucedo sent at 6/7/2023  8:50 AM CDT -----  .Type:  Needs Medical Advice    Who Called: Dolly  Symptoms (please be specific):    How long has patient had these symptoms:    Pharmacy name and phone #:    Would the patient rather a call back or a response via [facility]?   Best Call Back Number: [phone]  Additional Information: She would like to speak with the nurse re: She has questions about the mounjaro that was prescribed yesterday.

## 2023-06-07 NOTE — TELEPHONE ENCOUNTER
----- Message from Belinda Noble sent at 6/7/2023 11:41 AM CDT -----  Regarding: Needs Medical Advice  Type:  Needs Medical Advice    Who Called: pt    Pharmacy name and phone #:  [facility]  [address], [place] [id number] [phone]  Would the patient rather a call back or a response via [facility]?   Best Call Back Number: [phone]  Additional Information: pt called to see if the office received the PA for the medication Mounjaro. Called the b/l 2x's & no ans. Also only this [facility]. Please call pt.        20

## 2023-06-07 NOTE — TELEPHONE ENCOUNTER
Pt was calling about Mounjaro costing $400, I told her to wait as her PA has not gone through yet and that I will call her as soon as I have a determination

## 2023-06-19 DIAGNOSIS — R73.03 PREDIABETES: Primary | ICD-10-CM

## 2023-06-19 DIAGNOSIS — I10 BENIGN ESSENTIAL HTN: ICD-10-CM

## 2023-06-19 RX ORDER — LISINOPRIL 10 MG/1
10 TABLET ORAL EVERY MORNING
Qty: 90 TABLET | Refills: 3 | Status: SHIPPED | OUTPATIENT
Start: 2023-06-19

## 2023-06-19 RX ORDER — HYDROCHLOROTHIAZIDE 25 MG/1
25 TABLET ORAL DAILY
Qty: 90 TABLET | Refills: 3 | Status: SHIPPED | OUTPATIENT
Start: 2023-06-19

## 2023-06-19 NOTE — TELEPHONE ENCOUNTER
----- Message from Trevon Saucedo sent at 6/16/2023 12:02 PM CDT -----  .Type:  Needs Medical Advice    Who Called: Dolly  Symptoms (please be specific):    How long has patient had these symptoms:    Pharmacy name and phone #:    Would the patient rather a call back or a response via [facility]?   Best Call Back Number: [phone]  Additional Information: She needed to speak with nurse re: what next alternative she can do for medication johnynawaf was not covered with insurance.

## 2023-06-19 NOTE — TELEPHONE ENCOUNTER
Insurance denied Mounjaro. What is our next step, please advise, thanks.       Pt also needs two refills.

## 2023-06-21 ENCOUNTER — TELEPHONE (OUTPATIENT)
Dept: FAMILY MEDICINE | Facility: CLINIC | Age: 53
End: 2023-06-21
Payer: COMMERCIAL

## 2023-06-21 DIAGNOSIS — R73.03 PREDIABETES: Primary | ICD-10-CM

## 2023-06-21 NOTE — TELEPHONE ENCOUNTER
----- Message from [facility] sent at 6/21/2023 11:04 AM CDT -----  .Type:  Needs Medical Advice    Who Called:  pt      Would the patient rather a call back? Yes    Best Call Back Number:  [phone]    Additional Information:  insurance refused Ozempic pt wants to know what is the next step

## 2023-06-22 ENCOUNTER — TELEPHONE (OUTPATIENT)
Dept: FAMILY MEDICINE | Facility: CLINIC | Age: 53
End: 2023-06-22
Payer: COMMERCIAL

## 2023-06-22 DIAGNOSIS — E66.01 CLASS 3 SEVERE OBESITY DUE TO EXCESS CALORIES WITH SERIOUS COMORBIDITY AND BODY MASS INDEX (BMI) OF 50.0 TO 59.9 IN ADULT: Primary | ICD-10-CM

## 2023-06-22 NOTE — TELEPHONE ENCOUNTER
----- Message from Morteza Clements sent at 6/22/2023 10:21 AM CDT -----  .Type:  RX Refill Request    Who Called: pt  Refill or New Rx:New  RX Name and Strength:Wegovy  How is the patient currently taking it? (ex. 1XDay):  Is this a 30 day or 90 day RX:  Preferred Pharmacy with phone number:[id number] - [place] - [address] AT [facility] [address]  Local or Mail Order:  Ordering Provider:  Would the patient rather a call back or a response via [facility]? Call back  Best Call Back Number:[phone]  Additional Information: pt states that insurance will cover wegovy for weight loss

## 2023-06-26 ENCOUNTER — TELEPHONE (OUTPATIENT)
Dept: FAMILY MEDICINE | Facility: CLINIC | Age: 53
End: 2023-06-26
Payer: COMMERCIAL

## 2023-06-26 NOTE — TELEPHONE ENCOUNTER
----- Message from Trevon Saucedo sent at 6/26/2023 11:23 AM CDT -----  .Type:  Needs Medical Advice    Who Called: Dolly  Symptoms (please be specific):    How long has patient had these symptoms:    Pharmacy name and phone #:    Would the patient rather a call back or a response via [facility]?   Best Call Back Number: [phone]  Additional Information: She needs to speak with the nurse re: medication needs to get PA. The med are juan francisco and wegovy.

## 2023-07-31 ENCOUNTER — OFFICE VISIT (OUTPATIENT)
Dept: FAMILY MEDICINE | Facility: CLINIC | Age: 53
End: 2023-07-31
Payer: COMMERCIAL

## 2023-07-31 VITALS — BODY MASS INDEX: 57.48 KG/M2 | DIASTOLIC BLOOD PRESSURE: 89 MMHG | SYSTOLIC BLOOD PRESSURE: 132 MMHG | WEIGHT: 293 LBS

## 2023-07-31 DIAGNOSIS — E66.01 CLASS 3 SEVERE OBESITY DUE TO EXCESS CALORIES WITH SERIOUS COMORBIDITY AND BODY MASS INDEX (BMI) OF 50.0 TO 59.9 IN ADULT: Primary | ICD-10-CM

## 2023-07-31 DIAGNOSIS — L68.0 FEMALE HIRSUTISM: ICD-10-CM

## 2023-07-31 DIAGNOSIS — R06.09 DYSPNEA ON EXERTION: ICD-10-CM

## 2023-07-31 PROCEDURE — 3079F PR MOST RECENT DIASTOLIC BLOOD PRESSURE 80-89 MM HG: ICD-10-PCS | Mod: CPTII,95,, | Performed by: FAMILY MEDICINE

## 2023-07-31 PROCEDURE — 1160F RVW MEDS BY RX/DR IN RCRD: CPT | Mod: CPTII,95,, | Performed by: FAMILY MEDICINE

## 2023-07-31 PROCEDURE — 4010F ACE/ARB THERAPY RXD/TAKEN: CPT | Mod: CPTII,95,, | Performed by: FAMILY MEDICINE

## 2023-07-31 PROCEDURE — 3044F HG A1C LEVEL LT 7.0%: CPT | Mod: CPTII,95,, | Performed by: FAMILY MEDICINE

## 2023-07-31 PROCEDURE — 3075F PR MOST RECENT SYSTOLIC BLOOD PRESS GE 130-139MM HG: ICD-10-PCS | Mod: CPTII,95,, | Performed by: FAMILY MEDICINE

## 2023-07-31 PROCEDURE — 99214 OFFICE O/P EST MOD 30 MIN: CPT | Mod: 95,,, | Performed by: FAMILY MEDICINE

## 2023-07-31 PROCEDURE — 3075F SYST BP GE 130 - 139MM HG: CPT | Mod: CPTII,95,, | Performed by: FAMILY MEDICINE

## 2023-07-31 PROCEDURE — 1159F MED LIST DOCD IN RCRD: CPT | Mod: CPTII,95,, | Performed by: FAMILY MEDICINE

## 2023-07-31 PROCEDURE — 99214 PR OFFICE/OUTPT VISIT, EST, LEVL IV, 30-39 MIN: ICD-10-PCS | Mod: 95,,, | Performed by: FAMILY MEDICINE

## 2023-07-31 PROCEDURE — 1159F PR MEDICATION LIST DOCUMENTED IN MEDICAL RECORD: ICD-10-PCS | Mod: CPTII,95,, | Performed by: FAMILY MEDICINE

## 2023-07-31 PROCEDURE — 3079F DIAST BP 80-89 MM HG: CPT | Mod: CPTII,95,, | Performed by: FAMILY MEDICINE

## 2023-07-31 PROCEDURE — 3044F PR MOST RECENT HEMOGLOBIN A1C LEVEL <7.0%: ICD-10-PCS | Mod: CPTII,95,, | Performed by: FAMILY MEDICINE

## 2023-07-31 PROCEDURE — 1160F PR REVIEW ALL MEDS BY PRESCRIBER/CLIN PHARMACIST DOCUMENTED: ICD-10-PCS | Mod: CPTII,95,, | Performed by: FAMILY MEDICINE

## 2023-07-31 PROCEDURE — 3008F BODY MASS INDEX DOCD: CPT | Mod: CPTII,95,, | Performed by: FAMILY MEDICINE

## 2023-07-31 PROCEDURE — 4010F PR ACE/ARB THEARPY RXD/TAKEN: ICD-10-PCS | Mod: CPTII,95,, | Performed by: FAMILY MEDICINE

## 2023-07-31 PROCEDURE — 3008F PR BODY MASS INDEX (BMI) DOCUMENTED: ICD-10-PCS | Mod: CPTII,95,, | Performed by: FAMILY MEDICINE

## 2023-07-31 NOTE — PROGRESS NOTES
Patient ID: [id number]     Chief Complaint: Prediabetes        HPI:     This is a telemedicine note. Patient was treated using telemedicine, real time audio and video, according to [facility] protocols. I, Iliana Pelletier M.D. , conducted the visit from the [facility]. The patient participated in the visit at a non-[facility] location selected by the patient, identified below. I am licensed in the state where the patient stated they are located. The patient stated that they understood and accepted the privacy and security risks to their information at their location. This visit is not recorded.    Patient was located at the patient's home.     Dolly Romero is a 53 y.o. female here today for a telemedicine visit.    - She is morbidly obese and has pre-diabetes and she refuses weight loss surgery, she is awaiting an appointment with a dietician. She has pre-diabetes, would like to try Wegovy to help with BG control and obesity. She denies family history of MEN II or medullary thyroid cancer or personal history of pancreatitis, she is not trying to get pregnant.  - She also complains of SOB at times, worse with exertion x 2 weeks. She is currently asymptomatic. She is undergoing a cardiac workup with her Cardiologist (Dr. Ochoa). She denies smoking, but lived in a house with a smoker and she is concerned that she has COPD. She would like further evaluation. She has never had CXR or PFTs.   - She has hirsutism and would like to try Rx Vaniqa until she can save up for laser hair removal. She is taking Aldactone as well.   - Patient is without any other complaints today.       No past medical history on file.     History reviewed. No pertinent surgical history.    Review of patient's allergies indicates:  No Known Allergies    Outpatient Medications Marked as Taking for the 7/31/23 encounter (Office Visit) with Iliana Pelletier MD   Medication Sig Dispense Refill    albuterol (PROVENTIL/VENTOLIN  HFA) 90 mcg/actuation inhaler Inhale 2 puffs into the lungs every 6 (six) hours as needed.      aspirin 81 mg Cap Take 1 tablet by mouth once daily.      cholecalciferol, vitamin D3, (VITAMIN D3) 50 mcg (2,000 unit) Tab Take 2,000 Units by mouth once daily.      diclofenac (VOLTAREN) 50 MG EC tablet Take 50 mg by mouth 2 (two) times a day.      diclofenac sodium (VOLTAREN) 1 % Gel Apply 4 g topically 2 (two) times a day.      FEROSUL 325 mg (65 mg iron) Tab tablet Take 325 mg by mouth every other day.      fluticasone propionate (FLONASE) 50 mcg/actuation nasal spray 1 spray (50 mcg total) by Each Nostril route 2 (two) times a day. 16 g 11    hydroCHLOROthiazide (HYDRODIURIL) 25 MG tablet Take 1 tablet (25 mg total) by mouth once daily. 90 tablet 3    lisinopriL 10 MG tablet Take 1 tablet (10 mg total) by mouth every morning. 90 tablet 3    magnesium 30 mg Tab Take 30 mg by mouth 2 (two) times a day.      spironolactone (ALDACTONE) 50 MG tablet Take 50 mg by mouth 2 (two) times daily.      [DISCONTINUED] semaglutide, weight loss, 0.25 mg/0.5 mL PnIj Inject 0.25 mg into the skin every 7 days. 2 mL 1       Social History     Socioeconomic History    Marital status: Single   Tobacco Use    Smoking status: Never    Smokeless tobacco: Never   Substance and Sexual Activity    Alcohol use: Not Currently    Drug use: Never    Sexual activity: Yes     Partners: Male     Social Determinants of Health     Financial Resource Strain: Low Risk  (6/6/2023)    Overall Financial Resource Strain (CARDIA)     Difficulty of Paying Living Expenses: Not hard at all   Food Insecurity: No Food Insecurity (6/6/2023)    Hunger Vital Sign     Worried About Running Out of Food in the Last Year: Never true     Ran Out of Food in the Last Year: Never true   Transportation Needs: No Transportation Needs (6/6/2023)    PRAPARE - Transportation     Lack of Transportation (Medical): No     Lack of Transportation (Non-Medical): No   Physical Activity:  Inactive (6/6/2023)    Exercise Vital Sign     Days of Exercise per Week: 0 days     Minutes of Exercise per Session: 0 min   Stress: No Stress Concern Present (6/6/2023)    [employer] [place] [employer] - Occupational Stress Questionnaire     Feeling of Stress : Not at all   Social Connections: Moderately Isolated (6/6/2023)    Social Connection and Isolation Panel [NHANES]     Frequency of Communication with Friends and Family: More than three times a week     Frequency of Social Gatherings with Friends and Family: Once a week     Attends [employer] Services: More than 4 times per year     Active Member of Clubs or Organizations: No     Attends Club or Organization Meetings: Never     Marital Status: Never    Housing Stability: Low Risk  (6/6/2023)    Housing Stability Vital Sign     Unable to Pay for Housing in the Last Year: No     Number of Places Lived in the Last Year: 1     Unstable Housing in the Last Year: No        History reviewed. No pertinent family history.     Subjective:       See HPI for details    Constitutional: Denies Change in appetite. Denies Chills. Denies Fever. Denies Night sweats.  Eye: Denies Blurred vision. Denies Discharge. Denies Eye pain.  ENT: Denies Decreased hearing. Denies Sore throat. Denies Swollen glands.  Respiratory: Denies Cough. Reports Shortness of breath. Reports Shortness of breath with exertion. Denies Wheezing.  Cardiovascular: Denies Chest pain at rest. Denies Chest pain with exertion. Denies Irregular heartbeat. Denies Palpitations.  Gastrointestinal: Denies Abdominal pain. Denies Diarrhea. Denies Nausea. Denies Vomiting. Denies Hematemesis or Hematochezia.  Genitourinary: Denies Dysuria. Denies Urinary frequency. Denies Urinary urgency. Denies Blood in urine.  Endocrine: Denies Cold intolerance. Denies Excessive thirst. Denies Heat intolerance. Denies Weight loss. Denies Weight gain.  Musculoskeletal: Denies Painful joints. Denies  Weakness.  Integumentary: As per HPI. Denies Rash. Denies Itching. Denies Dry skin.  Neurologic: Denies Dizziness. Denies Fainting. Denies Headache.  Psychiatric: Denies Depression. Denies Anxiety. Denies Suicidal/Homicidal ideations.    All Other ROS: Negative except as stated in HPI.   Answers submitted by the patient for this visit:  Review of Systems Questionnaire (Submitted on 7/31/2023)  activity change: No  unexpected weight change: No  neck pain: No  hearing loss: No  rhinorrhea: No  trouble swallowing: No  eye discharge: No  visual disturbance: No  chest tightness: No  wheezing: No  chest pain: No  palpitations: Yes  blood in stool: No  constipation: No  vomiting: No  diarrhea: No  polydipsia: No  polyuria: No  difficulty urinating: No  hematuria: No  menstrual problem: No  dysuria: No  joint swelling: No  arthralgias: Yes  headaches: No  weakness: No  confusion: No  dysphoric mood: No      Objective:     /89 Comment: pt reported  Wt (!) 166.5 kg (367 lb)   LMP 06/05/2023 (Exact Date)   BMI 57.48 kg/m²     Physical Exam    Physical Exam: LIMITED DUE TO TELEMEDICINE RESTRICTIONS.  General: Alert and oriented, No acute distress. Obese.   Head: Normocephalic, Atraumatic.  Eye: Sclera non-icteric.  Neck/Thyroid:  Full range of motion.  Respiratory: Non-labored respirations, Symmetrical chest wall expansion.  Musculoskeletal: Normal range of motion.  Integumentary: Warm, Dry, Intact, No visible suspicious lesions or rashes. No diaphoresis.  Chin with excessive facial hair.   Neurologic: No focal deficits  Psychiatric: Normal interaction, Coherent speech, Euthymic mood, Appropriate affect       Assessment:       ICD-10-CM ICD-9-CM   1. Class 3 severe obesity due to excess calories with serious comorbidity and body mass index (BMI) of 50.0 to 59.9 in adult  E66.01 278.01    Z68.43 V85.43   2. Dyspnea on exertion  R06.09 786.09   3. Female hirsutism  L68.0 704.1        Plan:     Problem List Items  Addressed This Visit          Derm    Female hirsutism    Relevant Medications    eflornithine (VANIQA) 13.9 % cream     Other Visit Diagnoses       Class 3 severe obesity due to excess calories with serious comorbidity and body mass index (BMI) of 50.0 to 59.9 in adult    -  Primary    Relevant Medications    semaglutide, weight loss, 0.25 mg/0.5 mL PnIj    Dyspnea on exertion        Relevant Orders    X-Ray Chest PA And Lateral    Complete PFT w/ bronchodilator         1. Class 3 severe obesity due to excess calories with serious comorbidity and body mass index (BMI) of 50.0 to 59.9 in adult  - semaglutide, weight loss, 0.25 mg/0.5 mL PnIj; Inject 0.25 mg into the skin every 7 days.  Dispense: 2 mL; Refill: 1  - Diet. exercise, and 10% weight loss encouraged. Rx trial of Wegovy with close monitoring to help with pre-diabetes and obesity. She agrees to avoid pregnancy with Wegovy. Will titrate Rx Wegovy as needed/tolerated until max dose achieved. Notify M.D. or ER if symptoms persist or worsen, adverse Rx side effects, temp greater than 100.4, or any acute illness.    Body mass index is 57.48 kg/m².  Goal BMI <30.  Exercise 5 times a week for 30 minutes per day.  Avoid soda, simple sugars, excessive rice, potatoes or bread. Limit fast foods and fried foods.  Choose complex carbs in moderation (example: green vegetables, beans, oatmeal). Eat plenty of fresh fruits and vegetables with lean meats daily.  Do not skip meals. Eat a balanced portion size.  Avoid fad diets. Consider permanent healthy life style changes.      2. Dyspnea on exertion  - X-Ray Chest PA And Lateral; Future  - Complete PFT w/ bronchodilator; Future  - Will treat pending results. Continue followup with Cardiology as scheduled for cardiac workup. Notify M.D. or ER if symptoms persist or worsen, chest pain, palpitations, wheezing, temp >100.4, or any acute illness.      3. Female hirsutism  - Rx trial of eflornithine (VANIQA) 13.9 % cream; Apply  topically 2 (two) times daily.  Dispense: 45 g; Refill: 5  - Continue Spironolactone as prescribed. Patient to consider laser hair removal. Notify M.D. or ER if symptoms persist or worsen, temp >100.4, or any acute illness.          Dolly was seen today for prediabetes.    Diagnoses and all orders for this visit:    Class 3 severe obesity due to excess calories with serious comorbidity and body mass index (BMI) of 50.0 to 59.9 in adult  -     semaglutide, weight loss, 0.25 mg/0.5 mL PnIj; Inject 0.25 mg into the skin every 7 days.    Dyspnea on exertion  -     X-Ray Chest PA And Lateral; Future  -     Complete PFT w/ bronchodilator; Future    Female hirsutism  -     eflornithine (VANIQA) 13.9 % cream; Apply topically 2 (two) times daily.          Medication List with Changes/Refills   New Medications    EFLORNITHINE (VANIQA) 13.9 % CREAM    Apply topically 2 (two) times daily.       Start Date: 7/31/2023 End Date: --    SEMAGLUTIDE, WEIGHT LOSS, 0.25 MG/0.5 ML PNIJ    Inject 0.25 mg into the skin every 7 days.       Start Date: 7/31/2023 End Date: 9/29/2023   Current Medications    ALBUTEROL (PROVENTIL/VENTOLIN HFA) 90 MCG/ACTUATION INHALER    Inhale 2 puffs into the lungs every 6 (six) hours as needed.       Start Date: 4/3/2023  End Date: --    ASPIRIN 81 MG CAP    Take 1 tablet by mouth once daily.       Start Date: --        End Date: --    CHOLECALCIFEROL, VITAMIN D3, (VITAMIN D3) 50 MCG (2,000 UNIT) TAB    Take 2,000 Units by mouth once daily.       Start Date: --        End Date: --    DICLOFENAC (VOLTAREN) 50 MG EC TABLET    Take 50 mg by mouth 2 (two) times a day.       Start Date: 5/29/2023 End Date: --    DICLOFENAC SODIUM (VOLTAREN) 1 % GEL    Apply 4 g topically 2 (two) times a day.       Start Date: 5/30/2023 End Date: --    FEROSUL 325 MG (65 MG IRON) TAB TABLET    Take 325 mg by mouth every other day.       Start Date: 4/21/2023 End Date: --    FLUTICASONE PROPIONATE (FLONASE) 50 MCG/ACTUATION  NASAL SPRAY    1 spray (50 mcg total) by Each Nostril route 2 (two) times a day.       Start Date: 5/8/2023  End Date: --    HYDROCHLOROTHIAZIDE (HYDRODIURIL) 25 MG TABLET    Take 1 tablet (25 mg total) by mouth once daily.       Start Date: 6/19/2023 End Date: --    LISINOPRIL 10 MG TABLET    Take 1 tablet (10 mg total) by mouth every morning.       Start Date: 6/19/2023 End Date: --    MAGNESIUM 30 MG TAB    Take 30 mg by mouth 2 (two) times a day.       Start Date: --        End Date: --    SPIRONOLACTONE (ALDACTONE) 50 MG TABLET    Take 50 mg by mouth 2 (two) times daily.       Start Date: 5/4/2023  End Date: --   Discontinued Medications    SEMAGLUTIDE, WEIGHT LOSS, 0.25 MG/0.5 ML PNIJ    Inject 0.25 mg into the skin every 7 days.       Start Date: 6/22/2023 End Date: 7/31/2023          Follow up in about 4 weeks (around 8/28/2023) for Obesity Followup, Virtual Visit- *Cologuard results*.      Audio/Video Time Documentation:  Spent 17 minutes for telemedicine visit with successful audio/visual connection. [facility] was used for billing.

## 2023-08-15 ENCOUNTER — TELEPHONE (OUTPATIENT)
Dept: FAMILY MEDICINE | Facility: CLINIC | Age: 53
End: 2023-08-15
Payer: COMMERCIAL

## 2023-08-15 ENCOUNTER — PATIENT MESSAGE (OUTPATIENT)
Dept: FAMILY MEDICINE | Facility: CLINIC | Age: 53
End: 2023-08-15
Payer: COMMERCIAL

## 2023-08-15 NOTE — TELEPHONE ENCOUNTER
----- Message from Ammy Vargas sent at 8/15/2023  9:11 AM CDT -----  .Type:  Needs Medical Advice    Who Called: pt   Symptoms (please be specific):  cough, runny nose, throat pain when swallowing, body aches  How long has patient had these symptoms:  1 day   Pharmacy name and phone #:    Would the patient rather a call back or a response via [facility]? Call back   Best Call Back Number: [phone]  Additional Information: pt is requesting a call back from the nurse for the symptoms that she is experiencing.

## 2023-08-29 ENCOUNTER — PATIENT MESSAGE (OUTPATIENT)
Dept: FAMILY MEDICINE | Facility: CLINIC | Age: 53
End: 2023-08-29
Payer: COMMERCIAL

## 2023-08-29 ENCOUNTER — DOCUMENTATION ONLY (OUTPATIENT)
Dept: FAMILY MEDICINE | Facility: CLINIC | Age: 53
End: 2023-08-29
Payer: COMMERCIAL

## 2023-09-07 ENCOUNTER — PATIENT MESSAGE (OUTPATIENT)
Dept: FAMILY MEDICINE | Facility: CLINIC | Age: 53
End: 2023-09-07

## 2023-09-07 ENCOUNTER — OFFICE VISIT (OUTPATIENT)
Dept: FAMILY MEDICINE | Facility: CLINIC | Age: 53
End: 2023-09-07
Payer: COMMERCIAL

## 2023-09-07 VITALS — BODY MASS INDEX: 45.99 KG/M2 | WEIGHT: 293 LBS | HEIGHT: 67 IN

## 2023-09-07 DIAGNOSIS — R73.03 PREDIABETES: Primary | ICD-10-CM

## 2023-09-07 DIAGNOSIS — E66.01 CLASS 3 SEVERE OBESITY DUE TO EXCESS CALORIES WITH SERIOUS COMORBIDITY AND BODY MASS INDEX (BMI) OF 50.0 TO 59.9 IN ADULT: ICD-10-CM

## 2023-09-07 PROCEDURE — 1160F RVW MEDS BY RX/DR IN RCRD: CPT | Mod: CPTII,95,, | Performed by: FAMILY MEDICINE

## 2023-09-07 PROCEDURE — 3044F PR MOST RECENT HEMOGLOBIN A1C LEVEL <7.0%: ICD-10-PCS | Mod: CPTII,95,, | Performed by: FAMILY MEDICINE

## 2023-09-07 PROCEDURE — 99213 OFFICE O/P EST LOW 20 MIN: CPT | Mod: 95,,, | Performed by: FAMILY MEDICINE

## 2023-09-07 PROCEDURE — 4010F ACE/ARB THERAPY RXD/TAKEN: CPT | Mod: CPTII,95,, | Performed by: FAMILY MEDICINE

## 2023-09-07 PROCEDURE — 3008F BODY MASS INDEX DOCD: CPT | Mod: CPTII,95,, | Performed by: FAMILY MEDICINE

## 2023-09-07 PROCEDURE — 4010F PR ACE/ARB THEARPY RXD/TAKEN: ICD-10-PCS | Mod: CPTII,95,, | Performed by: FAMILY MEDICINE

## 2023-09-07 PROCEDURE — 3044F HG A1C LEVEL LT 7.0%: CPT | Mod: CPTII,95,, | Performed by: FAMILY MEDICINE

## 2023-09-07 PROCEDURE — 3008F PR BODY MASS INDEX (BMI) DOCUMENTED: ICD-10-PCS | Mod: CPTII,95,, | Performed by: FAMILY MEDICINE

## 2023-09-07 PROCEDURE — 1159F MED LIST DOCD IN RCRD: CPT | Mod: CPTII,95,, | Performed by: FAMILY MEDICINE

## 2023-09-07 PROCEDURE — 1160F PR REVIEW ALL MEDS BY PRESCRIBER/CLIN PHARMACIST DOCUMENTED: ICD-10-PCS | Mod: CPTII,95,, | Performed by: FAMILY MEDICINE

## 2023-09-07 PROCEDURE — 1159F PR MEDICATION LIST DOCUMENTED IN MEDICAL RECORD: ICD-10-PCS | Mod: CPTII,95,, | Performed by: FAMILY MEDICINE

## 2023-09-07 PROCEDURE — 99213 PR OFFICE/OUTPT VISIT, EST, LEVL III, 20-29 MIN: ICD-10-PCS | Mod: 95,,, | Performed by: FAMILY MEDICINE

## 2023-09-07 NOTE — PROGRESS NOTES
Patient ID: [id number]     Chief Complaint: Obesity        HPI:     This is a telemedicine note. Patient was treated using telemedicine, real time audio and video, according to [facility] protocols. Iliana MORSE M.D. , conducted the visit from the [facility]. The patient participated in the visit at a non-[facility] location selected by the patient, identified below. I am licensed in the state where the patient stated they are located. The patient stated that they understood and accepted the privacy and security risks to their information at their location. This visit is not recorded.    Patient was located at the patient's home.     Dolly Romero is a 53 y.o. female here today for a telemedicine visit.    - She is morbidly obese and has pre-diabetes and she refuses weight loss surgery, she is awaiting an appointment with a dietician. She has pre-diabetes, she has lost 9 pounds since last visit with Wegovy 0/25mg weekly, no side effects, she is ready to proceed with increased dose of Wegovy. She denies family history of MEN II or medullary thyroid cancer or personal history of pancreatitis, she is not trying to get pregnant.  - Patient is without any other complaints today.         No past medical history on file.     History reviewed. No pertinent surgical history.    Review of patient's allergies indicates:  No Known Allergies    Outpatient Medications Marked as Taking for the 9/7/23 encounter (Office Visit) with Iliana Pelletier MD   Medication Sig Dispense Refill    albuterol (PROVENTIL/VENTOLIN HFA) 90 mcg/actuation inhaler Inhale 2 puffs into the lungs every 6 (six) hours as needed.      aspirin 81 mg Cap Take 1 tablet by mouth once daily.      cholecalciferol, vitamin D3, (VITAMIN D3) 50 mcg (2,000 unit) Tab Take 2,000 Units by mouth once daily.      diclofenac (VOLTAREN) 50 MG EC tablet Take 50 mg by mouth 2 (two) times a day.      diclofenac sodium (VOLTAREN) 1 % Gel Apply 4 g  topically 2 (two) times a day.      eflornithine (VANIQA) 13.9 % cream Apply topically 2 (two) times daily. 45 g 5    FEROSUL 325 mg (65 mg iron) Tab tablet Take 325 mg by mouth every other day.      fluticasone propionate (FLONASE) 50 mcg/actuation nasal spray 1 spray (50 mcg total) by Each Nostril route 2 (two) times a day. 16 g 11    hydroCHLOROthiazide (HYDRODIURIL) 25 MG tablet Take 1 tablet (25 mg total) by mouth once daily. 90 tablet 3    lisinopriL 10 MG tablet Take 1 tablet (10 mg total) by mouth every morning. 90 tablet 3    magnesium 30 mg Tab Take 30 mg by mouth 2 (two) times a day.      spironolactone (ALDACTONE) 50 MG tablet Take 50 mg by mouth 2 (two) times daily.      [DISCONTINUED] semaglutide, weight loss, 0.25 mg/0.5 mL PnIj Inject 0.25 mg into the skin every 7 days. 2 mL 1       Social History     Socioeconomic History    Marital status: Single   Tobacco Use    Smoking status: Never    Smokeless tobacco: Never   Substance and Sexual Activity    Alcohol use: Not Currently    Drug use: Never    Sexual activity: Yes     Partners: Male     Social Determinants of Health     Financial Resource Strain: Low Risk  (6/6/2023)    Overall Financial Resource Strain (CARDIA)     Difficulty of Paying Living Expenses: Not hard at all   Food Insecurity: No Food Insecurity (6/6/2023)    Hunger Vital Sign     Worried About Running Out of Food in the Last Year: Never true     Ran Out of Food in the Last Year: Never true   Transportation Needs: No Transportation Needs (6/6/2023)    PRAPARE - Transportation     Lack of Transportation (Medical): No     Lack of Transportation (Non-Medical): No   Physical Activity: Inactive (6/6/2023)    Exercise Vital Sign     Days of Exercise per Week: 0 days     Minutes of Exercise per Session: 0 min   Stress: No Stress Concern Present (6/6/2023)    [place] [employer] - Occupational Stress Questionnaire     Feeling of Stress : Not at all   Social Connections:  Moderately Isolated (6/6/2023)    Social Connection and Isolation Panel [NHANES]     Frequency of Communication with Friends and Family: More than three times a week     Frequency of Social Gatherings with Friends and Family: Once a week     Attends Rastafarian Services: More than 4 times per year     Active Member of Clubs or Organizations: No     Attends Club or Organization Meetings: Never     Marital Status: Never    Housing Stability: Low Risk  (6/6/2023)    Housing Stability Vital Sign     Unable to Pay for Housing in the Last Year: No     Number of Places Lived in the Last Year: 1     Unstable Housing in the Last Year: No        History reviewed. No pertinent family history.     Subjective:       See HPI for details    Constitutional: Denies Change in appetite. Denies Chills. Denies Fever. Denies Night sweats.  Eye: Denies Blurred vision. Denies Discharge. Denies Eye pain.  ENT: Denies Decreased hearing. Denies Sore throat. Denies Swollen glands.  Respiratory: Denies Cough. Denies Shortness of breath. Denies Shortness of breath with exertion. Denies Wheezing.  Cardiovascular: Denies Chest pain at rest. Denies Chest pain with exertion. Denies Irregular heartbeat. Denies Palpitations.  Gastrointestinal: Denies Abdominal pain. Denies Diarrhea. Denies Nausea. Denies Vomiting. Denies Hematemesis or Hematochezia.  Genitourinary: Denies Dysuria. Denies Urinary frequency. Denies Urinary urgency. Denies Blood in urine.  Endocrine: Denies Cold intolerance. Denies Excessive thirst. Denies Heat intolerance. Denies Weight loss. Denies Weight gain.  Musculoskeletal: Denies Painful joints. Denies Weakness.  Integumentary: Denies Rash. Denies Itching. Denies Dry skin.  Neurologic: Denies Dizziness. Denies Fainting. Denies Headache.  Psychiatric: Denies Depression. Denies Anxiety. Denies Suicidal/Homicidal ideations.    All Other ROS: Negative except as stated in HPI.   Answers submitted by the patient for this  "visit:  Review of Systems Questionnaire (Submitted on 9/1/2023)  activity change: No  unexpected weight change: No  neck pain: No  hearing loss: No  rhinorrhea: No  trouble swallowing: No  eye discharge: No  visual disturbance: No  chest tightness: No  wheezing: No  chest pain: No  palpitations: No  blood in stool: No  constipation: No  vomiting: No  diarrhea: No  polydipsia: No  polyuria: No  difficulty urinating: No  hematuria: No  menstrual problem: No  dysuria: No  joint swelling: No  arthralgias: No  headaches: No  weakness: No  confusion: No  dysphoric mood: No      Objective:     Ht 5' 7" (1.702 m)   Wt (!) 162.4 kg (358 lb)   BMI 56.07 kg/m²     Physical Exam    Physical Exam: LIMITED DUE TO TELEMEDICINE RESTRICTIONS.  General: Alert and oriented, No acute distress. Obese.   Head: Normocephalic, Atraumatic.  Eye: Sclera non-icteric.  Neck/Thyroid:  Full range of motion.  Respiratory: Non-labored respirations, Symmetrical chest wall expansion.  Musculoskeletal: Normal range of motion.  Integumentary: Warm, Dry, Intact, No visible suspicious lesions or rashes. No diaphoresis.   Neurologic: No focal deficits  Psychiatric: Normal interaction, Coherent speech, Euthymic mood, Appropriate affect         Assessment:       ICD-10-CM ICD-9-CM   1. Prediabetes  R73.03 790.29   2. Class 3 severe obesity due to excess calories with serious comorbidity and body mass index (BMI) of 50.0 to 59.9 in adult  E66.01 278.01    Z68.43 V85.43        Plan:     Problem List Items Addressed This Visit    None  Visit Diagnoses       Prediabetes    -  Primary    Relevant Medications    semaglutide, weight loss, 0.5 mg/0.5 mL PnIj    Class 3 severe obesity due to excess calories with serious comorbidity and body mass index (BMI) of 50.0 to 59.9 in adult        Relevant Medications    semaglutide, weight loss, 0.5 mg/0.5 mL PnIj         1. Prediabetes  - semaglutide, weight loss, 0.5 mg/0.5 mL PnIj; Inject 0.5 mg into the skin every 7 " days.  Dispense: 4 each; Refill: 1  - Diet, exercise, and 10% weight loss encouraged. Rx trial of increased dose of Wegovy to 0.5mg weekly with close monitoring to help with pre-diabetes and obesity. She agrees to avoid pregnancy with Wegovy. Will titrate Rx Wegovy as needed/tolerated until max dose achieved. Recheck CMP, HgA1C in 12/2023. Notify M.D. or ER if symptoms persist or worsen, adverse Rx side effects, temp greater than 100.4, or any acute illness.    Lab Results   Component Value Date    HGBA1C 5.7 06/02/2023      Continue   On ACE and Statin according to guidelines.  Follow ADA Diet. Avoid soda, simple sweets, and limit rice/pasta/breads/starches.  Maintain healthy weight with goal BMI <30.  Exercise 5 times per week for 30 minutes per day.  Stressed importance of daily foot exams.  Stressed importance of annual dilated eye exam.   Body mass index is 56.07 kg/m².  Goal BMI <30.  Exercise 5 times a week for 30 minutes per day.  Avoid soda, simple sugars, excessive rice, potatoes or bread. Limit fast foods and fried foods.  Choose complex carbs in moderation (example: green vegetables, beans, oatmeal). Eat plenty of fresh fruits and vegetables with lean meats daily.  Do not skip meals. Eat a balanced portion size.  Avoid fad diets. Consider permanent healthy life style changes.      2. Class 3 severe obesity due to excess calories with serious comorbidity and body mass index (BMI) of 50.0 to 59.9 in adult  - semaglutide, weight loss, 0.5 mg/0.5 mL PnIj; Inject 0.5 mg into the skin every 7 days.  Dispense: 4 each; Refill: 1  - Same as #1.       Dolly was seen today for obesity.    Diagnoses and all orders for this visit:    Prediabetes  -     semaglutide, weight loss, 0.5 mg/0.5 mL PnIj; Inject 0.5 mg into the skin every 7 days.    Class 3 severe obesity due to excess calories with serious comorbidity and body mass index (BMI) of 50.0 to 59.9 in adult  -     semaglutide, weight loss, 0.5 mg/0.5 mL PnIj;  Inject 0.5 mg into the skin every 7 days.          Medication List with Changes/Refills   New Medications    SEMAGLUTIDE, WEIGHT LOSS, 0.5 MG/0.5 ML PNIJ    Inject 0.5 mg into the skin every 7 days.       Start Date: 9/7/2023  End Date: 11/6/2023   Current Medications    ALBUTEROL (PROVENTIL/VENTOLIN HFA) 90 MCG/ACTUATION INHALER    Inhale 2 puffs into the lungs every 6 (six) hours as needed.       Start Date: 4/3/2023  End Date: --    ASPIRIN 81 MG CAP    Take 1 tablet by mouth once daily.       Start Date: --        End Date: --    CHOLECALCIFEROL, VITAMIN D3, (VITAMIN D3) 50 MCG (2,000 UNIT) TAB    Take 2,000 Units by mouth once daily.       Start Date: --        End Date: --    DICLOFENAC (VOLTAREN) 50 MG EC TABLET    Take 50 mg by mouth 2 (two) times a day.       Start Date: 5/29/2023 End Date: --    DICLOFENAC SODIUM (VOLTAREN) 1 % GEL    Apply 4 g topically 2 (two) times a day.       Start Date: 5/30/2023 End Date: --    EFLORNITHINE (VANIQA) 13.9 % CREAM    Apply topically 2 (two) times daily.       Start Date: 7/31/2023 End Date: --    FEROSUL 325 MG (65 MG IRON) TAB TABLET    Take 325 mg by mouth every other day.       Start Date: 4/21/2023 End Date: --    FLUTICASONE PROPIONATE (FLONASE) 50 MCG/ACTUATION NASAL SPRAY    1 spray (50 mcg total) by Each Nostril route 2 (two) times a day.       Start Date: 5/8/2023  End Date: --    HYDROCHLOROTHIAZIDE (HYDRODIURIL) 25 MG TABLET    Take 1 tablet (25 mg total) by mouth once daily.       Start Date: 6/19/2023 End Date: --    LISINOPRIL 10 MG TABLET    Take 1 tablet (10 mg total) by mouth every morning.       Start Date: 6/19/2023 End Date: --    MAGNESIUM 30 MG TAB    Take 30 mg by mouth 2 (two) times a day.       Start Date: --        End Date: --    SPIRONOLACTONE (ALDACTONE) 50 MG TABLET    Take 50 mg by mouth 2 (two) times daily.       Start Date: 5/4/2023  End Date: --   Discontinued Medications    SEMAGLUTIDE, WEIGHT LOSS, 0.25 MG/0.5 ML PNIJ    Inject 0.25  mg into the skin every 7 days.       Start Date: 7/31/2023 End Date: 9/7/2023          Follow up in about 4 weeks (around 10/5/2023) for Obesity Followup, Virtual Visit.      Audio/Video Time Documentation:  Spent 10 minutes for telemedicine visit with successful audio/visual connection. [facility] was used for billing.

## 2023-09-07 NOTE — PATIENT INSTRUCTIONS
Lukas Alberto,     If you are due for any health screening(s) below please notify me so we can arrange them to be ordered and scheduled. Most healthy patients at your age complete them, but you are free to accept or refuse.     If you can't do it, I'll definitely understand. If you can, I'd certainly appreciate it!    All of your core healthy metrics are met.

## 2023-09-10 ENCOUNTER — PATIENT MESSAGE (OUTPATIENT)
Dept: FAMILY MEDICINE | Facility: CLINIC | Age: 53
End: 2023-09-10
Payer: COMMERCIAL

## 2023-09-11 ENCOUNTER — OFFICE VISIT (OUTPATIENT)
Dept: FAMILY MEDICINE | Facility: CLINIC | Age: 53
End: 2023-09-11
Payer: COMMERCIAL

## 2023-09-11 DIAGNOSIS — J06.9 UPPER RESPIRATORY TRACT INFECTION, UNSPECIFIED TYPE: Primary | ICD-10-CM

## 2023-09-11 PROCEDURE — 1160F PR REVIEW ALL MEDS BY PRESCRIBER/CLIN PHARMACIST DOCUMENTED: ICD-10-PCS | Mod: CPTII,95,, | Performed by: FAMILY MEDICINE

## 2023-09-11 PROCEDURE — 1159F MED LIST DOCD IN RCRD: CPT | Mod: CPTII,95,, | Performed by: FAMILY MEDICINE

## 2023-09-11 PROCEDURE — 4010F PR ACE/ARB THEARPY RXD/TAKEN: ICD-10-PCS | Mod: CPTII,95,, | Performed by: FAMILY MEDICINE

## 2023-09-11 PROCEDURE — 4010F ACE/ARB THERAPY RXD/TAKEN: CPT | Mod: CPTII,95,, | Performed by: FAMILY MEDICINE

## 2023-09-11 PROCEDURE — 1160F RVW MEDS BY RX/DR IN RCRD: CPT | Mod: CPTII,95,, | Performed by: FAMILY MEDICINE

## 2023-09-11 PROCEDURE — 99213 PR OFFICE/OUTPT VISIT, EST, LEVL III, 20-29 MIN: ICD-10-PCS | Mod: 95,,, | Performed by: FAMILY MEDICINE

## 2023-09-11 PROCEDURE — 1159F PR MEDICATION LIST DOCUMENTED IN MEDICAL RECORD: ICD-10-PCS | Mod: CPTII,95,, | Performed by: FAMILY MEDICINE

## 2023-09-11 PROCEDURE — 99213 OFFICE O/P EST LOW 20 MIN: CPT | Mod: 95,,, | Performed by: FAMILY MEDICINE

## 2023-09-11 PROCEDURE — 3044F HG A1C LEVEL LT 7.0%: CPT | Mod: CPTII,95,, | Performed by: FAMILY MEDICINE

## 2023-09-11 PROCEDURE — 3044F PR MOST RECENT HEMOGLOBIN A1C LEVEL <7.0%: ICD-10-PCS | Mod: CPTII,95,, | Performed by: FAMILY MEDICINE

## 2023-09-11 RX ORDER — AZITHROMYCIN 250 MG/1
TABLET, FILM COATED ORAL
Qty: 6 TABLET | Refills: 0 | Status: SHIPPED | OUTPATIENT
Start: 2023-09-11 | End: 2023-09-16

## 2023-09-11 RX ORDER — PROMETHAZINE HYDROCHLORIDE AND DEXTROMETHORPHAN HYDROBROMIDE 6.25; 15 MG/5ML; MG/5ML
5 SYRUP ORAL EVERY 8 HOURS PRN
Qty: 120 ML | Refills: 0 | Status: SHIPPED | OUTPATIENT
Start: 2023-09-11 | End: 2023-12-07 | Stop reason: SDUPTHER

## 2023-09-11 NOTE — PROGRESS NOTES
Patient ID: [id number]     Chief Complaint: Headache, Cough, and Fatigue        HPI:     This is a telemedicine note. Patient was treated using telemedicine, real time audio and video, according to [facility] protocols. Iliana MORSE M.D. , conducted the visit from the [facility]. The patient participated in the visit at a non-[facility] location selected by the patient, identified below. I am licensed in the state where the patient stated they are located. The patient stated that they understood and accepted the privacy and security risks to their information at their location. This visit is not recorded.    Patient was located at the patient's home.     Dolly Romero is a 53 y.o. female here today for a telemedicine visit.    - Patient complains of frontal headache, fatigue, clear rhinorrhea, itchy/dry throat, mild nausea, and dry cough x 2-3 days. She did at home COVID-19 test that was negative yesterday. She reports that her daughter tested positive for COVID-19. She denies fever, chills, sneezing, watery eyes,  or vomiting. She has tried OTC Nyquil with mild resolution of symptoms. She take Lisinopril for BP control.  - Patient is without any other complaints today.         No past medical history on file.     History reviewed. No pertinent surgical history.    Review of patient's allergies indicates:  No Known Allergies    Outpatient Medications Marked as Taking for the 9/11/23 encounter (Office Visit) with Iliana Pelletier MD   Medication Sig Dispense Refill    albuterol (PROVENTIL/VENTOLIN HFA) 90 mcg/actuation inhaler Inhale 2 puffs into the lungs every 6 (six) hours as needed.      aspirin 81 mg Cap Take 1 tablet by mouth once daily.      cholecalciferol, vitamin D3, (VITAMIN D3) 50 mcg (2,000 unit) Tab Take 2,000 Units by mouth once daily.      diclofenac (VOLTAREN) 50 MG EC tablet Take 50 mg by mouth 2 (two) times a day.      diclofenac sodium (VOLTAREN) 1 % Gel Apply 4 g  topically 2 (two) times a day.      eflornithine (VANIQA) 13.9 % cream Apply topically 2 (two) times daily. 45 g 5    FEROSUL 325 mg (65 mg iron) Tab tablet Take 325 mg by mouth every other day.      fluticasone propionate (FLONASE) 50 mcg/actuation nasal spray 1 spray (50 mcg total) by Each Nostril route 2 (two) times a day. 16 g 11    hydroCHLOROthiazide (HYDRODIURIL) 25 MG tablet Take 1 tablet (25 mg total) by mouth once daily. 90 tablet 3    lisinopriL 10 MG tablet Take 1 tablet (10 mg total) by mouth every morning. 90 tablet 3    magnesium 30 mg Tab Take 30 mg by mouth 2 (two) times a day.      semaglutide, weight loss, 0.5 mg/0.5 mL PnIj Inject 0.5 mg into the skin every 7 days. 4 each 1    spironolactone (ALDACTONE) 50 MG tablet Take 50 mg by mouth 2 (two) times daily.         Social History     Socioeconomic History    Marital status: Single   Tobacco Use    Smoking status: Never    Smokeless tobacco: Never   Substance and Sexual Activity    Alcohol use: Not Currently    Drug use: Never    Sexual activity: Yes     Partners: Male     Social Determinants of Health     Financial Resource Strain: Low Risk  (6/6/2023)    Overall Financial Resource Strain (CARDIA)     Difficulty of Paying Living Expenses: Not hard at all   Food Insecurity: No Food Insecurity (6/6/2023)    Hunger Vital Sign     Worried About Running Out of Food in the Last Year: Never true     Ran Out of Food in the Last Year: Never true   Transportation Needs: No Transportation Needs (6/6/2023)    PRAPARE - Transportation     Lack of Transportation (Medical): No     Lack of Transportation (Non-Medical): No   Physical Activity: Inactive (6/6/2023)    Exercise Vital Sign     Days of Exercise per Week: 0 days     Minutes of Exercise per Session: 0 min   Stress: No Stress Concern Present (6/6/2023)    [employer] - Occupational Stress Questionnaire     Feeling of Stress : Not at all   Social Connections: Moderately  Isolated (6/6/2023)    Social Connection and Isolation Panel [NHANES]     Frequency of Communication with Friends and Family: More than three times a week     Frequency of Social Gatherings with Friends and Family: Once a week     Attends Sabianism Services: More than 4 times per year     Active Member of Clubs or Organizations: No     Attends Club or Organization Meetings: Never     Marital Status: Never    Housing Stability: Low Risk  (6/6/2023)    Housing Stability Vital Sign     Unable to Pay for Housing in the Last Year: No     Number of Places Lived in the Last Year: 1     Unstable Housing in the Last Year: No        History reviewed. No pertinent family history.     Subjective:       See HPI for details    Constitutional: As per HPI. Denies Change in appetite. Denies Chills. Denies Fever. Denies Night sweats.  Eye: Denies Blurred vision. Denies Discharge. Denies Eye pain.  ENT: Denies Decreased hearing. Denies Sore throat. Denies Swollen glands.  Respiratory: Reports Cough. Denies Shortness of breath. Denies Shortness of breath with exertion. Denies Wheezing.  Cardiovascular: Denies Chest pain at rest. Denies Chest pain with exertion. Denies Irregular heartbeat. Denies Palpitations.  Gastrointestinal: Denies Abdominal pain. Denies Diarrhea. Denies Nausea. Denies Vomiting. Denies Hematemesis or Hematochezia.  Genitourinary: Denies Dysuria. Denies Urinary frequency. Denies Urinary urgency. Denies Blood in urine.  Endocrine: Denies Cold intolerance. Denies Excessive thirst. Denies Heat intolerance. Denies Weight loss. Denies Weight gain.  Musculoskeletal: Denies Painful joints. Denies Weakness.  Integumentary: Denies Rash. Denies Itching. Denies Dry skin.  Neurologic: Denies Dizziness. Denies Fainting. Reports Headache.  Psychiatric: Denies Depression. Denies Anxiety. Denies Suicidal/Homicidal ideations.    All Other ROS: Negative except as stated in HPI.   Answers submitted by the patient for this  visit:  Review of Systems Questionnaire (Submitted on 9/11/2023)  activity change: No  unexpected weight change: No  neck pain: No  hearing loss: No  rhinorrhea: Yes  trouble swallowing: No  eye discharge: No  visual disturbance: No  chest tightness: No  wheezing: No  chest pain: No  palpitations: No  blood in stool: No  constipation: No  vomiting: No  diarrhea: No  polydipsia: No  polyuria: No  difficulty urinating: No  hematuria: No  menstrual problem: No  dysuria: No  joint swelling: No  arthralgias: No  headaches: Yes  weakness: Yes  confusion: No  dysphoric mood: No      Objective:     LMP 09/07/2023 (Exact Date)     Physical Exam    Physical Exam: LIMITED DUE TO TELEMEDICINE RESTRICTIONS.  General: Alert and oriented, No acute distress. Obese.   Head: Normocephalic, Atraumatic.  Eye: Sclera non-icteric.  Neck/Thyroid:  Full range of motion.  Respiratory: Non-labored respirations, Symmetrical chest wall expansion.  Musculoskeletal: Normal range of motion.  Integumentary: Warm, Dry, Intact, No visible suspicious lesions or rashes. No diaphoresis.   Neurologic: No focal deficits  Psychiatric: Normal interaction, Coherent speech, Euthymic mood, Appropriate affect.        Assessment:       ICD-10-CM ICD-9-CM   1. Upper respiratory tract infection, unspecified type  J06.9 465.9        Plan:     Problem List Items Addressed This Visit    None  Visit Diagnoses       Upper respiratory tract infection, unspecified type    -  Primary    Relevant Medications    azithromycin (Z-ERICA) 250 MG tablet    promethazine-dextromethorphan (PROMETHAZINE-DM) 6.25-15 mg/5 mL Syrp    Other Relevant Orders    COVID/RSV/FLU A&B PCR         1. Upper respiratory tract infection, unspecified type  - COVID/RSV/FLU A&B PCR; Future  - Rx trial of azithromycin (Z-ERICA) 250 MG tablet; Take 2 tablets by mouth on day 1; Take 1 tablet by mouth on days 2-5  Dispense: 6 tablet; Refill: 0  - Rx trial of promethazine-dextromethorphan (PROMETHAZINE-DM)  6.25-15 mg/5 mL Syrp; Take 5 mLs by mouth every 8 (eight) hours as needed (cough).  Dispense: 120 mL; Refill: 0  - Patient to resume Rx Flonase as directed. Increase fluid intake. Will followup on COVID/RSV/Flu test results and patient agrees to self quarantine while awaiting results. If workup is negative and symptoms persist despite treatment, will change Lisinopril to Benicar. Notify M.D. or ER if symptoms persist or worsen, SOB, wheezing, hemoptysis, chest pain, temp >100.4, or any acute illness.        Dolly was seen today for headache, cough and fatigue.    Diagnoses and all orders for this visit:    Upper respiratory tract infection, unspecified type  -     COVID/RSV/FLU A&B PCR; Future  -     azithromycin (Z-ERICA) 250 MG tablet; Take 2 tablets by mouth on day 1; Take 1 tablet by mouth on days 2-5  -     promethazine-dextromethorphan (PROMETHAZINE-DM) 6.25-15 mg/5 mL Syrp; Take 5 mLs by mouth every 8 (eight) hours as needed (cough).          Medication List with Changes/Refills   New Medications    AZITHROMYCIN (Z-ERICA) 250 MG TABLET    Take 2 tablets by mouth on day 1; Take 1 tablet by mouth on days 2-5       Start Date: 9/11/2023 End Date: 9/16/2023    PROMETHAZINE-DEXTROMETHORPHAN (PROMETHAZINE-DM) 6.25-15 MG/5 ML SYRP    Take 5 mLs by mouth every 8 (eight) hours as needed (cough).       Start Date: 9/11/2023 End Date: --   Current Medications    ALBUTEROL (PROVENTIL/VENTOLIN HFA) 90 MCG/ACTUATION INHALER    Inhale 2 puffs into the lungs every 6 (six) hours as needed.       Start Date: 4/3/2023  End Date: --    ASPIRIN 81 MG CAP    Take 1 tablet by mouth once daily.       Start Date: --        End Date: --    CHOLECALCIFEROL, VITAMIN D3, (VITAMIN D3) 50 MCG (2,000 UNIT) TAB    Take 2,000 Units by mouth once daily.       Start Date: --        End Date: --    DICLOFENAC (VOLTAREN) 50 MG EC TABLET    Take 50 mg by mouth 2 (two) times a day.       Start Date: 5/29/2023 End Date: --    DICLOFENAC SODIUM  (VOLTAREN) 1 % GEL    Apply 4 g topically 2 (two) times a day.       Start Date: 5/30/2023 End Date: --    EFLORNITHINE (VANIQA) 13.9 % CREAM    Apply topically 2 (two) times daily.       Start Date: 7/31/2023 End Date: --    FEROSUL 325 MG (65 MG IRON) TAB TABLET    Take 325 mg by mouth every other day.       Start Date: 4/21/2023 End Date: --    FLUTICASONE PROPIONATE (FLONASE) 50 MCG/ACTUATION NASAL SPRAY    1 spray (50 mcg total) by Each Nostril route 2 (two) times a day.       Start Date: 5/8/2023  End Date: --    HYDROCHLOROTHIAZIDE (HYDRODIURIL) 25 MG TABLET    Take 1 tablet (25 mg total) by mouth once daily.       Start Date: 6/19/2023 End Date: --    LISINOPRIL 10 MG TABLET    Take 1 tablet (10 mg total) by mouth every morning.       Start Date: 6/19/2023 End Date: --    MAGNESIUM 30 MG TAB    Take 30 mg by mouth 2 (two) times a day.       Start Date: --        End Date: --    SEMAGLUTIDE, WEIGHT LOSS, 0.5 MG/0.5 ML PNIJ    Inject 0.5 mg into the skin every 7 days.       Start Date: 9/7/2023  End Date: 11/6/2023    SPIRONOLACTONE (ALDACTONE) 50 MG TABLET    Take 50 mg by mouth 2 (two) times daily.       Start Date: 5/4/2023  End Date: --          Follow up if symptoms worsen or fail to improve.      Audio/Video Time Documentation:  Spent 15 minutes for telemedicine visit with successful audio/visual connection. [facility] was used for billing.

## 2023-09-14 ENCOUNTER — PATIENT MESSAGE (OUTPATIENT)
Dept: FAMILY MEDICINE | Facility: CLINIC | Age: 53
End: 2023-09-14
Payer: COMMERCIAL

## 2023-09-18 ENCOUNTER — OFFICE VISIT (OUTPATIENT)
Dept: RHEUMATOLOGY | Facility: CLINIC | Age: 53
End: 2023-09-18
Payer: COMMERCIAL

## 2023-09-18 VITALS
TEMPERATURE: 99 F | HEART RATE: 105 BPM | BODY MASS INDEX: 45.99 KG/M2 | HEIGHT: 67 IN | OXYGEN SATURATION: 98 % | SYSTOLIC BLOOD PRESSURE: 134 MMHG | WEIGHT: 293 LBS | DIASTOLIC BLOOD PRESSURE: 85 MMHG

## 2023-09-18 DIAGNOSIS — M79.7 FIBROMYALGIA SYNDROME: ICD-10-CM

## 2023-09-18 DIAGNOSIS — M05.9 SEROPOSITIVE RHEUMATOID ARTHRITIS: Primary | ICD-10-CM

## 2023-09-18 DIAGNOSIS — G47.00 INSOMNIA, UNSPECIFIED TYPE: ICD-10-CM

## 2023-09-18 PROCEDURE — 3044F PR MOST RECENT HEMOGLOBIN A1C LEVEL <7.0%: ICD-10-PCS | Mod: CPTII,S$GLB,, | Performed by: INTERNAL MEDICINE

## 2023-09-18 PROCEDURE — 1159F PR MEDICATION LIST DOCUMENTED IN MEDICAL RECORD: ICD-10-PCS | Mod: CPTII,S$GLB,, | Performed by: INTERNAL MEDICINE

## 2023-09-18 PROCEDURE — 99204 PR OFFICE/OUTPT VISIT, NEW, LEVL IV, 45-59 MIN: ICD-10-PCS | Mod: S$GLB,,, | Performed by: INTERNAL MEDICINE

## 2023-09-18 PROCEDURE — 3044F HG A1C LEVEL LT 7.0%: CPT | Mod: CPTII,S$GLB,, | Performed by: INTERNAL MEDICINE

## 2023-09-18 PROCEDURE — 3008F BODY MASS INDEX DOCD: CPT | Mod: CPTII,S$GLB,, | Performed by: INTERNAL MEDICINE

## 2023-09-18 PROCEDURE — 1159F MED LIST DOCD IN RCRD: CPT | Mod: CPTII,S$GLB,, | Performed by: INTERNAL MEDICINE

## 2023-09-18 PROCEDURE — 3075F SYST BP GE 130 - 139MM HG: CPT | Mod: CPTII,S$GLB,, | Performed by: INTERNAL MEDICINE

## 2023-09-18 PROCEDURE — 99999 PR PBB SHADOW E&M-EST. PATIENT-LVL IV: ICD-10-PCS | Mod: PBBFAC,,, | Performed by: INTERNAL MEDICINE

## 2023-09-18 PROCEDURE — 99999 PR PBB SHADOW E&M-EST. PATIENT-LVL IV: CPT | Mod: PBBFAC,,, | Performed by: INTERNAL MEDICINE

## 2023-09-18 PROCEDURE — 3075F PR MOST RECENT SYSTOLIC BLOOD PRESS GE 130-139MM HG: ICD-10-PCS | Mod: CPTII,S$GLB,, | Performed by: INTERNAL MEDICINE

## 2023-09-18 PROCEDURE — 3079F PR MOST RECENT DIASTOLIC BLOOD PRESSURE 80-89 MM HG: ICD-10-PCS | Mod: CPTII,S$GLB,, | Performed by: INTERNAL MEDICINE

## 2023-09-18 PROCEDURE — 3079F DIAST BP 80-89 MM HG: CPT | Mod: CPTII,S$GLB,, | Performed by: INTERNAL MEDICINE

## 2023-09-18 PROCEDURE — 4010F ACE/ARB THERAPY RXD/TAKEN: CPT | Mod: CPTII,S$GLB,, | Performed by: INTERNAL MEDICINE

## 2023-09-18 PROCEDURE — 4010F PR ACE/ARB THEARPY RXD/TAKEN: ICD-10-PCS | Mod: CPTII,S$GLB,, | Performed by: INTERNAL MEDICINE

## 2023-09-18 PROCEDURE — 99204 OFFICE O/P NEW MOD 45 MIN: CPT | Mod: S$GLB,,, | Performed by: INTERNAL MEDICINE

## 2023-09-18 PROCEDURE — 3008F PR BODY MASS INDEX (BMI) DOCUMENTED: ICD-10-PCS | Mod: CPTII,S$GLB,, | Performed by: INTERNAL MEDICINE

## 2023-09-18 RX ORDER — TIZANIDINE 4 MG/1
4 TABLET ORAL NIGHTLY
Qty: 30 TABLET | Refills: 5 | Status: SHIPPED | OUTPATIENT
Start: 2023-09-18 | End: 2024-03-16

## 2023-09-18 RX ORDER — HYDROXYCHLOROQUINE SULFATE 200 MG/1
200 TABLET, FILM COATED ORAL 2 TIMES DAILY
Qty: 60 TABLET | Refills: 5 | Status: SHIPPED | OUTPATIENT
Start: 2023-09-18 | End: 2024-03-20

## 2023-09-18 RX ORDER — OMEPRAZOLE 40 MG/1
40 CAPSULE, DELAYED RELEASE ORAL DAILY
Qty: 30 CAPSULE | Refills: 11 | Status: SHIPPED | OUTPATIENT
Start: 2023-09-18 | End: 2024-09-17

## 2023-09-18 RX ORDER — METFORMIN HYDROCHLORIDE 500 MG/1
500 TABLET ORAL DAILY
COMMUNITY
Start: 2023-08-29

## 2023-09-18 RX ORDER — CELECOXIB 200 MG/1
200 CAPSULE ORAL
Qty: 30 CAPSULE | Refills: 5 | Status: SHIPPED | OUTPATIENT
Start: 2023-09-18

## 2023-09-18 NOTE — PROGRESS NOTES
"Subjective:       Patient ID: Dolly Romero is a 53 y.o. female.    Chief Complaint: Referral (Referral for arthritis. Patient complains of heel pain and pain in joel legs. Pain 3/10. Patient states that it only hurts when she walks but when she does it hurts all over. )    Pt  is complaining of joint pain involving his MCP PIP wrist elbow shoulders hips knees and ankles bilaterally.  Is 4/10 in intensity dull in quality and continuous.  It is associated with a morning stiffness lasting for more than 60 minutes. Pt reports having difficulty maintaining a good night of sleep and this has been associated with myalgia of 4/10 in intensity.  This pain is dull continuous and gets worse mainly at night.  It is associated with fatigue.  No fever no chills no others.    Labs checked during this visit   RF 29      Review of Systems   Constitutional:  Negative for appetite change, chills and fever.   HENT:  Negative for congestion, ear pain, mouth sores, nosebleeds and trouble swallowing.    Eyes:  Negative for photophobia and discharge.   Respiratory:  Negative for chest tightness and shortness of breath.    Cardiovascular:  Negative for chest pain.   Gastrointestinal:  Negative for abdominal pain and vomiting.   Endocrine: Negative.    Genitourinary:  Negative for hematuria.   Musculoskeletal:         As per HPI   Skin:  Negative for rash.   Neurological:  Negative for weakness.         Objective:   /85 (BP Location: Right arm, Patient Position: Sitting, BP Method: Large (Automatic))   Pulse 105   Temp 98.6 °F (37 °C) (Oral)   Ht 5' 7" (1.702 m)   Wt (!) 163.3 kg (360 lb)   LMP 08/07/2023 (Exact Date)   SpO2 98%   BMI 56.38 kg/m²      Physical Exam   Constitutional: She is oriented to person, place, and time. She appears well-developed and well-nourished. No distress.   HENT:   Head: Normocephalic and atraumatic.   Right Ear: External ear normal.   Left Ear: External ear normal.   Eyes: Pupils are " equal, round, and reactive to light.   Cardiovascular: Normal rate, regular rhythm and normal heart sounds.   Pulmonary/Chest: Breath sounds normal.   Abdominal: Soft. There is no abdominal tenderness.   Musculoskeletal:      Right shoulder: Tenderness present.      Left shoulder: Tenderness present.      Right elbow: Tenderness present.      Left elbow: Tenderness present.      Right wrist: Tenderness present.      Left wrist: Tenderness present.      Cervical back: Neck supple.      Right hip: Tenderness present.      Left hip: Tenderness present.      Right knee: Tenderness present.      Left knee: Tenderness present.      Right ankle: Tenderness present.      Left ankle: Tenderness present.   Lymphadenopathy:     She has no cervical adenopathy.   Neurological: She is alert and oriented to person, place, and time. She displays normal reflexes. No cranial nerve deficit or sensory deficit. She exhibits normal muscle tone. Coordination normal.   Skin: No rash noted. No erythema.   Vitals reviewed.      Right Side Rheumatological Exam     The patient is tender to palpation of the shoulder, elbow, wrist, knee, 1st PIP, 1st MCP, 2nd PIP, 2nd MCP, 3rd PIP, 3rd MCP, 4th PIP, 4th MCP, 5th PIP, hip, ankle, 1st MTP, 2nd MTP, 3rd MTP, 4th MTP, 5th MTP, 1st toe IP, 2nd toe IP, 3rd toe IP, 4th toe IP and 5th toe IP    Left Side Rheumatological Exam     The patient is tender to palpation of the shoulder, elbow, wrist, knee, 1st PIP, 1st MCP, 2nd PIP, 2nd MCP, 3rd PIP, 3rd MCP, 4th PIP, 4th MCP, 5th PIP, 5th MCP, hip, ankle, 1st MTP, 2nd MTP, 3rd MTP, 4th MTP, 5th MTP, 1st toe IP, 2nd toe IP, 3rd toe IP, 4th toe IP and 5th toe IP.         Completed Fibromyalgia exam 18/18 tender points.  No data to display     Assessment:       1. Seropositive rheumatoid arthritis    2. Insomnia, unspecified type    3. Fibromyalgia syndrome          Medication List with Changes/Refills   New Medications    CELECOXIB (CELEBREX) 200 MG CAPSULE     Take 1 capsule (200 mg total) by mouth after lunch.       Start Date: 9/18/2023 End Date: --    HYDROXYCHLOROQUINE (PLAQUENIL) 200 MG TABLET    Take 1 tablet (200 mg total) by mouth 2 (two) times daily. After food       Start Date: 9/18/2023 End Date: 3/20/2024    OMEPRAZOLE (PRILOSEC) 40 MG CAPSULE    Take 1 capsule (40 mg total) by mouth once daily. Before lunch       Start Date: 9/18/2023 End Date: 9/17/2024    TIZANIDINE (ZANAFLEX) 4 MG TABLET    Take 1 tablet (4 mg total) by mouth nightly.       Start Date: 9/18/2023 End Date: 3/16/2024   Current Medications    ALBUTEROL (PROVENTIL/VENTOLIN HFA) 90 MCG/ACTUATION INHALER    Inhale 2 puffs into the lungs every 6 (six) hours as needed.       Start Date: 4/3/2023  End Date: --    ASPIRIN 81 MG CAP    Take 1 tablet by mouth once daily.       Start Date: --        End Date: --    CHOLECALCIFEROL, VITAMIN D3, (VITAMIN D3) 50 MCG (2,000 UNIT) TAB    Take 2,000 Units by mouth once daily.       Start Date: --        End Date: --    DICLOFENAC (VOLTAREN) 50 MG EC TABLET    Take 50 mg by mouth 2 (two) times a day.       Start Date: 5/29/2023 End Date: --    DICLOFENAC SODIUM (VOLTAREN) 1 % GEL    Apply 4 g topically 2 (two) times a day.       Start Date: 5/30/2023 End Date: --    EFLORNITHINE (VANIQA) 13.9 % CREAM    Apply topically 2 (two) times daily.       Start Date: 7/31/2023 End Date: --    FEROSUL 325 MG (65 MG IRON) TAB TABLET    Take 325 mg by mouth every other day.       Start Date: 4/21/2023 End Date: --    FLUTICASONE PROPIONATE (FLONASE) 50 MCG/ACTUATION NASAL SPRAY    1 spray (50 mcg total) by Each Nostril route 2 (two) times a day.       Start Date: 5/8/2023  End Date: --    HYDROCHLOROTHIAZIDE (HYDRODIURIL) 25 MG TABLET    Take 1 tablet (25 mg total) by mouth once daily.       Start Date: 6/19/2023 End Date: --    LISINOPRIL 10 MG TABLET    Take 1 tablet (10 mg total) by mouth every morning.       Start Date: 6/19/2023 End Date: --    MAGNESIUM 30 MG TAB     Take 30 mg by mouth 2 (two) times a day.       Start Date: --        End Date: --    METFORMIN (GLUCOPHAGE) 500 MG TABLET    Take 500 mg by mouth once daily.       Start Date: 8/29/2023 End Date: --    PROMETHAZINE-DEXTROMETHORPHAN (PROMETHAZINE-DM) 6.25-15 MG/5 ML SYRP    Take 5 mLs by mouth every 8 (eight) hours as needed (cough).       Start Date: 9/11/2023 End Date: --    SEMAGLUTIDE, WEIGHT LOSS, 0.5 MG/0.5 ML PNIJ    Inject 0.5 mg into the skin every 7 days.       Start Date: 9/7/2023  End Date: 11/6/2023    SPIRONOLACTONE (ALDACTONE) 50 MG TABLET    Take 50 mg by mouth 2 (two) times daily.       Start Date: 5/4/2023  End Date: --         Plan:         Problem List Items Addressed This Visit          Immunology/Multi System    Seropositive rheumatoid arthritis - Primary    Relevant Medications    celecoxib (CELEBREX) 200 MG capsule    omeprazole (PRILOSEC) 40 MG capsule    tiZANidine (ZANAFLEX) 4 MG tablet    hydroxychloroquine (PLAQUENIL) 200 mg tablet    Other Relevant Orders    CBC Auto Differential    Comprehensive Metabolic Panel    CRP, High Sensitivity    Vitamin D    Rheumatoid Quantitative    Cyclic Citrullinated Peptide Antibody, IgG    Antinuclear Ab, HEp-2 Substrate    Hepatitis B Surface Antigen    Hepatitis C Antibody    TSH    T4, Free    Quantiferon Gold TB       Orthopedic    Fibromyalgia syndrome    Relevant Medications    celecoxib (CELEBREX) 200 MG capsule    omeprazole (PRILOSEC) 40 MG capsule    tiZANidine (ZANAFLEX) 4 MG tablet    hydroxychloroquine (PLAQUENIL) 200 mg tablet    Other Relevant Orders    CBC Auto Differential    Comprehensive Metabolic Panel    CRP, High Sensitivity    Vitamin D    Rheumatoid Quantitative    Cyclic Citrullinated Peptide Antibody, IgG    Antinuclear Ab, HEp-2 Substrate    Hepatitis B Surface Antigen    Hepatitis C Antibody    TSH    T4, Free    Quantiferon Gold TB       Other    Insomnia    Relevant Medications    celecoxib (CELEBREX) 200 MG capsule     omeprazole (PRILOSEC) 40 MG capsule    tiZANidine (ZANAFLEX) 4 MG tablet    hydroxychloroquine (PLAQUENIL) 200 mg tablet    Other Relevant Orders    CBC Auto Differential    Comprehensive Metabolic Panel    CRP, High Sensitivity    Vitamin D    Rheumatoid Quantitative    Cyclic Citrullinated Peptide Antibody, IgG    Antinuclear Ab, HEp-2 Substrate    Hepatitis B Surface Antigen    Hepatitis C Antibody    TSH    T4, Free    Quantiferon Gold TB

## 2023-10-19 ENCOUNTER — OFFICE VISIT (OUTPATIENT)
Dept: FAMILY MEDICINE | Facility: CLINIC | Age: 53
End: 2023-10-19
Payer: COMMERCIAL

## 2023-10-19 VITALS — WEIGHT: 293 LBS | BODY MASS INDEX: 45.99 KG/M2 | HEIGHT: 67 IN

## 2023-10-19 DIAGNOSIS — D84.821 DRUG-INDUCED IMMUNODEFICIENCY: ICD-10-CM

## 2023-10-19 DIAGNOSIS — E66.01 CLASS 3 SEVERE OBESITY DUE TO EXCESS CALORIES WITH SERIOUS COMORBIDITY AND BODY MASS INDEX (BMI) OF 50.0 TO 59.9 IN ADULT: Primary | ICD-10-CM

## 2023-10-19 DIAGNOSIS — Z79.899 DRUG-INDUCED IMMUNODEFICIENCY: ICD-10-CM

## 2023-10-19 PROCEDURE — 3008F BODY MASS INDEX DOCD: CPT | Mod: CPTII,95,, | Performed by: FAMILY MEDICINE

## 2023-10-19 PROCEDURE — 1160F PR REVIEW ALL MEDS BY PRESCRIBER/CLIN PHARMACIST DOCUMENTED: ICD-10-PCS | Mod: CPTII,95,, | Performed by: FAMILY MEDICINE

## 2023-10-19 PROCEDURE — 1159F PR MEDICATION LIST DOCUMENTED IN MEDICAL RECORD: ICD-10-PCS | Mod: CPTII,95,, | Performed by: FAMILY MEDICINE

## 2023-10-19 PROCEDURE — 99213 OFFICE O/P EST LOW 20 MIN: CPT | Mod: 95,,, | Performed by: FAMILY MEDICINE

## 2023-10-19 PROCEDURE — 4010F PR ACE/ARB THEARPY RXD/TAKEN: ICD-10-PCS | Mod: CPTII,95,, | Performed by: FAMILY MEDICINE

## 2023-10-19 PROCEDURE — 3008F PR BODY MASS INDEX (BMI) DOCUMENTED: ICD-10-PCS | Mod: CPTII,95,, | Performed by: FAMILY MEDICINE

## 2023-10-19 PROCEDURE — 1159F MED LIST DOCD IN RCRD: CPT | Mod: CPTII,95,, | Performed by: FAMILY MEDICINE

## 2023-10-19 PROCEDURE — 3044F HG A1C LEVEL LT 7.0%: CPT | Mod: CPTII,95,, | Performed by: FAMILY MEDICINE

## 2023-10-19 PROCEDURE — 1160F RVW MEDS BY RX/DR IN RCRD: CPT | Mod: CPTII,95,, | Performed by: FAMILY MEDICINE

## 2023-10-19 PROCEDURE — 4010F ACE/ARB THERAPY RXD/TAKEN: CPT | Mod: CPTII,95,, | Performed by: FAMILY MEDICINE

## 2023-10-19 PROCEDURE — 3044F PR MOST RECENT HEMOGLOBIN A1C LEVEL <7.0%: ICD-10-PCS | Mod: CPTII,95,, | Performed by: FAMILY MEDICINE

## 2023-10-19 PROCEDURE — 99213 PR OFFICE/OUTPT VISIT, EST, LEVL III, 20-29 MIN: ICD-10-PCS | Mod: 95,,, | Performed by: FAMILY MEDICINE

## 2023-10-19 NOTE — PROGRESS NOTES
Patient ID: [id number]     Chief Complaint: Obesity        HPI:     This is a telemedicine note. Patient was treated using telemedicine, real time audio and video, according to [facility] protocols. Iliana MORSE M.D. , conducted the visit from the [facility]. The patient participated in the visit at a non-[facility] location selected by the patient, identified below. I am licensed in the state where the patient stated they are located. The patient stated that they understood and accepted the privacy and security risks to their information at their location. This visit is not recorded.    Patient was located at the patient's home.     Dolly Romero is a 53 y.o. female here today for a telemedicine visit.    - She is morbidly obese and has pre-diabetes and she refuses weight loss surgery, she is awaiting an appointment with a dietician. She has pre-diabetes, she has lost 5 pounds since last visit with Wegovy 0.5mg weekly, no side effects, she is ready to proceed with increased dose of Wegovy. She denies family history of MEN II or medullary thyroid cancer or personal history of pancreatitis, she is not trying to get pregnant.  - She has RA/drug induced immunodeficiency is controlled with Rx, no side effects, followed by Rheumatology (Dr. Pickering).   - Patient is without any other complaints today.         ----------------------------  PCOS (polycystic ovarian syndrome)     Past Surgical History:   Procedure Laterality Date    ANGIOGRAM  EXTREMITY BILATERAL Right        Review of patient's allergies indicates:  No Known Allergies    Outpatient Medications Marked as Taking for the 10/19/23 encounter (Office Visit) with Iliana Pelletier MD   Medication Sig Dispense Refill    albuterol (PROVENTIL/VENTOLIN HFA) 90 mcg/actuation inhaler Inhale 2 puffs into the lungs every 6 (six) hours as needed.      aspirin 81 mg Cap Take 1 tablet by mouth once daily.      celecoxib (CELEBREX) 200 MG capsule  Take 1 capsule (200 mg total) by mouth after lunch. 30 capsule 5    cholecalciferol, vitamin D3, (VITAMIN D3) 50 mcg (2,000 unit) Tab Take 2,000 Units by mouth once daily.      diclofenac (VOLTAREN) 50 MG EC tablet Take 50 mg by mouth 2 (two) times a day.      diclofenac sodium (VOLTAREN) 1 % Gel Apply 4 g topically 2 (two) times a day.      FEROSUL 325 mg (65 mg iron) Tab tablet Take 325 mg by mouth every other day.      fluticasone propionate (FLONASE) 50 mcg/actuation nasal spray 1 spray (50 mcg total) by Each Nostril route 2 (two) times a day. 16 g 11    hydroCHLOROthiazide (HYDRODIURIL) 25 MG tablet Take 1 tablet (25 mg total) by mouth once daily. 90 tablet 3    hydroxychloroquine (PLAQUENIL) 200 mg tablet Take 1 tablet (200 mg total) by mouth 2 (two) times daily. After food 60 tablet 5    lisinopriL 10 MG tablet Take 1 tablet (10 mg total) by mouth every morning. 90 tablet 3    magnesium 30 mg Tab Take 30 mg by mouth 2 (two) times a day.      metFORMIN (GLUCOPHAGE) 500 MG tablet Take 500 mg by mouth once daily.      omeprazole (PRILOSEC) 40 MG capsule Take 1 capsule (40 mg total) by mouth once daily. Before lunch 30 capsule 11    promethazine-dextromethorphan (PROMETHAZINE-DM) 6.25-15 mg/5 mL Syrp Take 5 mLs by mouth every 8 (eight) hours as needed (cough). 120 mL 0    spironolactone (ALDACTONE) 50 MG tablet Take 50 mg by mouth 2 (two) times daily.      tiZANidine (ZANAFLEX) 4 MG tablet Take 1 tablet (4 mg total) by mouth nightly. 30 tablet 5    [DISCONTINUED] semaglutide, weight loss, 0.5 mg/0.5 mL PnIj Inject 0.5 mg into the skin every 7 days. 4 each 1       Social History     Socioeconomic History    Marital status: Single   Tobacco Use    Smoking status: Never    Smokeless tobacco: Never   Substance and Sexual Activity    Alcohol use: Not Currently    Drug use: Never    Sexual activity: Yes     Partners: Male     Social Determinants of Health     Financial Resource Strain: Low Risk  (6/6/2023)    Overall  Financial Resource Strain (CARDIA)     Difficulty of Paying Living Expenses: Not hard at all   Food Insecurity: No Food Insecurity (6/6/2023)    Hunger Vital Sign     Worried About Running Out of Food in the Last Year: Never true     Ran Out of Food in the Last Year: Never true   Transportation Needs: No Transportation Needs (6/6/2023)    PRAPARE - Transportation     Lack of Transportation (Medical): No     Lack of Transportation (Non-Medical): No   Physical Activity: Inactive (6/6/2023)    Exercise Vital Sign     Days of Exercise per Week: 0 days     Minutes of Exercise per Session: 0 min   Stress: No Stress Concern Present (6/6/2023)    [employer] [place] [employer] - Occupational Stress Questionnaire     Feeling of Stress : Not at all   Social Connections: Moderately Isolated (6/6/2023)    Social Connection and Isolation Panel [NHANES]     Frequency of Communication with Friends and Family: More than three times a week     Frequency of Social Gatherings with Friends and Family: Once a week     Attends [employer] Services: More than 4 times per year     Active Member of Clubs or Organizations: No     Attends Club or Organization Meetings: Never     Marital Status: Never    Housing Stability: Low Risk  (6/6/2023)    Housing Stability Vital Sign     Unable to Pay for Housing in the Last Year: No     Number of Places Lived in the Last Year: 1     Unstable Housing in the Last Year: No        Family History   Problem Relation Age of Onset    Hypertension Mother     Diabetes Mother     Arthritis Mother     Pancreatic cancer Mother     Heart disease Father     Prostate cancer Father         Subjective:       See HPI for details    Constitutional: Denies Change in appetite. Denies Chills. Denies Fever. Denies Night sweats.  Eye: Denies Blurred vision. Denies Discharge. Denies Eye pain.  ENT: Denies Decreased hearing. Denies Sore throat. Denies Swollen glands.  Respiratory: Denies Cough. Denies Shortness  "of breath. Denies Shortness of breath with exertion. Denies Wheezing.  Cardiovascular: Denies Chest pain at rest. Denies Chest pain with exertion. Denies Irregular heartbeat. Denies Palpitations.  Gastrointestinal: Denies Abdominal pain. Denies Diarrhea. Denies Nausea. Denies Vomiting. Denies Hematemesis or Hematochezia.  Genitourinary: Denies Dysuria. Denies Urinary frequency. Denies Urinary urgency. Denies Blood in urine.  Endocrine: Denies Cold intolerance. Denies Excessive thirst. Denies Heat intolerance. Denies Weight loss. Denies Weight gain.  Musculoskeletal: Denies Painful joints. Denies Weakness.  Integumentary: Denies Rash. Denies Itching. Denies Dry skin.  Neurologic: Denies Dizziness. Denies Fainting. Denies Headache.  Psychiatric: Denies Depression. Denies Anxiety. Denies Suicidal/Homicidal ideations.    All Other ROS: Negative except as stated in HPI.   Answers submitted by the patient for this visit:  Review of Systems Questionnaire (Submitted on 10/19/2023)  activity change: No  unexpected weight change: No  neck pain: No  hearing loss: No  rhinorrhea: No  trouble swallowing: No  eye discharge: No  visual disturbance: No  chest tightness: No  wheezing: No  chest pain: No  palpitations: No  blood in stool: No  constipation: No  vomiting: No  diarrhea: No  polydipsia: No  polyuria: No  difficulty urinating: No  hematuria: No  menstrual problem: No  dysuria: No  joint swelling: No  arthralgias: No  headaches: No  weakness: No  confusion: No  dysphoric mood: No      Objective:     Ht 5' 7" (1.702 m)   Wt (!) 161 kg (355 lb)   BMI 55.60 kg/m²     Physical Exam    Physical Exam: LIMITED DUE TO TELEMEDICINE RESTRICTIONS.  General: Alert and oriented, No acute distress. Obese.   Head: Normocephalic, Atraumatic.  Eye: Sclera non-icteric.  Neck/Thyroid:  Full range of motion.  Respiratory: Non-labored respirations, Symmetrical chest wall expansion.  Musculoskeletal: Normal range of motion.  Integumentary: " Warm, Dry, Intact, No visible suspicious lesions or rashes. No diaphoresis.   Neurologic: No focal deficits  Psychiatric: Normal interaction, Coherent speech, Euthymic mood, Appropriate affect       Assessment:       ICD-10-CM ICD-9-CM   1. Class 3 severe obesity due to excess calories with serious comorbidity and body mass index (BMI) of 50.0 to 59.9 in adult  E66.01 278.01    Z68.43 V85.43   2. Drug-induced immunodeficiency  D84.821 279.3    Z79.899 E947.9        Plan:     Problem List Items Addressed This Visit          Immunology/Multi System    Drug-induced immunodeficiency     Other Visit Diagnoses       Class 3 severe obesity due to excess calories with serious comorbidity and body mass index (BMI) of 50.0 to 59.9 in adult    -  Primary    Relevant Medications    semaglutide, weight loss, 1 mg/0.5 mL PnIj         1. Class 3 severe obesity due to excess calories with serious comorbidity and body mass index (BMI) of 50.0 to 59.9 in adult  - semaglutide, weight loss, 1 mg/0.5 mL PnIj; Inject 1 mg into the skin every 7 days.  Dispense: 4 each; Refill: 1  - Diet, exercise, and 10% weight loss encouraged. Rx trial of increased dose of Wegovy to 1mg weekly with close monitoring to help with pre-diabetes and obesity. She agrees to avoid pregnancy with Wegovy. Will titrate Rx Wegovy as needed/tolerated until max dose achieved. Recheck CMP, HgA1C in 12/2023. Notify M.D. or ER if symptoms persist or worsen, adverse Rx side effects, temp greater than 100.4, or any acute illness.          Lab Results   Component Value Date     HGBA1C 5.7 06/02/2023      Continue   On ACE and Statin according to guidelines.  Follow ADA Diet. Avoid soda, simple sweets, and limit rice/pasta/breads/starches.  Maintain healthy weight with goal BMI <30.  Exercise 5 times per week for 30 minutes per day.  Stressed importance of daily foot exams.  Stressed importance of annual dilated eye exam.   Body mass index is 55.6 kg/m².  Goal BMI  <30.  Exercise 5 times a week for 30 minutes per day.  Avoid soda, simple sugars, excessive rice, potatoes or bread. Limit fast foods and fried foods.  Choose complex carbs in moderation (example: green vegetables, beans, oatmeal). Eat plenty of fresh fruits and vegetables with lean meats daily.  Do not skip meals. Eat a balanced portion size.  Avoid fad diets. Consider permanent healthy life style changes.      2. Drug-induced immunodeficiency  - Continue followup with Rheumatology as scheduled, continue current treatment plan.       Dolly was seen today for obesity.    Diagnoses and all orders for this visit:    Class 3 severe obesity due to excess calories with serious comorbidity and body mass index (BMI) of 50.0 to 59.9 in adult  -     semaglutide, weight loss, 1 mg/0.5 mL PnIj; Inject 1 mg into the skin every 7 days.    Drug-induced immunodeficiency          Medication List with Changes/Refills   New Medications    SEMAGLUTIDE, WEIGHT LOSS, 1 MG/0.5 ML PNIJ    Inject 1 mg into the skin every 7 days.       Start Date: 10/19/2023End Date: 12/18/2023   Current Medications    ALBUTEROL (PROVENTIL/VENTOLIN HFA) 90 MCG/ACTUATION INHALER    Inhale 2 puffs into the lungs every 6 (six) hours as needed.       Start Date: 4/3/2023  End Date: --    ASPIRIN 81 MG CAP    Take 1 tablet by mouth once daily.       Start Date: --        End Date: --    CELECOXIB (CELEBREX) 200 MG CAPSULE    Take 1 capsule (200 mg total) by mouth after lunch.       Start Date: 9/18/2023 End Date: --    CHOLECALCIFEROL, VITAMIN D3, (VITAMIN D3) 50 MCG (2,000 UNIT) TAB    Take 2,000 Units by mouth once daily.       Start Date: --        End Date: --    DICLOFENAC (VOLTAREN) 50 MG EC TABLET    Take 50 mg by mouth 2 (two) times a day.       Start Date: 5/29/2023 End Date: --    DICLOFENAC SODIUM (VOLTAREN) 1 % GEL    Apply 4 g topically 2 (two) times a day.       Start Date: 5/30/2023 End Date: --    FEROSUL 325 MG (65 MG IRON) TAB TABLET    Take  325 mg by mouth every other day.       Start Date: 4/21/2023 End Date: --    FLUTICASONE PROPIONATE (FLONASE) 50 MCG/ACTUATION NASAL SPRAY    1 spray (50 mcg total) by Each Nostril route 2 (two) times a day.       Start Date: 5/8/2023  End Date: --    HYDROCHLOROTHIAZIDE (HYDRODIURIL) 25 MG TABLET    Take 1 tablet (25 mg total) by mouth once daily.       Start Date: 6/19/2023 End Date: --    HYDROXYCHLOROQUINE (PLAQUENIL) 200 MG TABLET    Take 1 tablet (200 mg total) by mouth 2 (two) times daily. After food       Start Date: 9/18/2023 End Date: 3/20/2024    LISINOPRIL 10 MG TABLET    Take 1 tablet (10 mg total) by mouth every morning.       Start Date: 6/19/2023 End Date: --    MAGNESIUM 30 MG TAB    Take 30 mg by mouth 2 (two) times a day.       Start Date: --        End Date: --    METFORMIN (GLUCOPHAGE) 500 MG TABLET    Take 500 mg by mouth once daily.       Start Date: 8/29/2023 End Date: --    OMEPRAZOLE (PRILOSEC) 40 MG CAPSULE    Take 1 capsule (40 mg total) by mouth once daily. Before lunch       Start Date: 9/18/2023 End Date: 9/17/2024    PROMETHAZINE-DEXTROMETHORPHAN (PROMETHAZINE-DM) 6.25-15 MG/5 ML SYRP    Take 5 mLs by mouth every 8 (eight) hours as needed (cough).       Start Date: 9/11/2023 End Date: --    SPIRONOLACTONE (ALDACTONE) 50 MG TABLET    Take 50 mg by mouth 2 (two) times daily.       Start Date: 5/4/2023  End Date: --    TIZANIDINE (ZANAFLEX) 4 MG TABLET    Take 1 tablet (4 mg total) by mouth nightly.       Start Date: 9/18/2023 End Date: 3/16/2024   Discontinued Medications    EFLORNITHINE (VANIQA) 13.9 % CREAM    Apply topically 2 (two) times daily.       Start Date: 7/31/2023 End Date: 10/19/2023    SEMAGLUTIDE, WEIGHT LOSS, 0.5 MG/0.5 ML PNIJ    Inject 0.5 mg into the skin every 7 days.       Start Date: 9/7/2023  End Date: 10/19/2023          Follow up in about 4 weeks (around 11/16/2023) for Obesity Followup, Virtual Visit.      Audio/Video Time Documentation:  Spent 16 minutes for  telemedicine visit with successful audio/visual connection. MDM was used for billing.

## 2023-12-01 ENCOUNTER — PATIENT MESSAGE (OUTPATIENT)
Dept: FAMILY MEDICINE | Facility: CLINIC | Age: 53
End: 2023-12-01
Payer: COMMERCIAL

## 2023-12-05 ENCOUNTER — PATIENT MESSAGE (OUTPATIENT)
Dept: FAMILY MEDICINE | Facility: CLINIC | Age: 53
End: 2023-12-05
Payer: COMMERCIAL

## 2023-12-05 DIAGNOSIS — E66.01 CLASS 3 SEVERE OBESITY DUE TO EXCESS CALORIES WITH SERIOUS COMORBIDITY AND BODY MASS INDEX (BMI) OF 50.0 TO 59.9 IN ADULT: Primary | ICD-10-CM

## 2023-12-07 ENCOUNTER — PATIENT MESSAGE (OUTPATIENT)
Dept: FAMILY MEDICINE | Facility: CLINIC | Age: 53
End: 2023-12-07

## 2023-12-07 ENCOUNTER — E-VISIT (OUTPATIENT)
Dept: FAMILY MEDICINE | Facility: CLINIC | Age: 53
End: 2023-12-07
Payer: COMMERCIAL

## 2023-12-07 DIAGNOSIS — J06.9 UPPER RESPIRATORY TRACT INFECTION, UNSPECIFIED TYPE: Primary | ICD-10-CM

## 2023-12-07 PROCEDURE — 99421 OL DIG E/M SVC 5-10 MIN: CPT | Mod: ,,, | Performed by: FAMILY MEDICINE

## 2023-12-07 PROCEDURE — 99421 PR E&M, ONLINE DIGIT, EST, < 7 DAYS, 5-10 MINS: ICD-10-PCS | Mod: ,,, | Performed by: FAMILY MEDICINE

## 2023-12-07 RX ORDER — DOXYCYCLINE 100 MG/1
100 CAPSULE ORAL EVERY 12 HOURS
Qty: 14 CAPSULE | Refills: 0 | Status: SHIPPED | OUTPATIENT
Start: 2023-12-07 | End: 2023-12-14

## 2023-12-07 RX ORDER — PROMETHAZINE HYDROCHLORIDE AND DEXTROMETHORPHAN HYDROBROMIDE 6.25; 15 MG/5ML; MG/5ML
5 SYRUP ORAL EVERY 8 HOURS PRN
Qty: 120 ML | Refills: 0 | Status: SHIPPED | OUTPATIENT
Start: 2023-12-07 | End: 2024-02-01

## 2023-12-07 NOTE — PROGRESS NOTES
Patient ID: Dolly Romero is a 53 y.o. female.    Chief Complaint: URI (Entered automatically based on patient selection in Patient Portal.)    The patient initiated a request through [facility] on 12/7/2023 for evaluation and management with a chief complaint of URI (Entered automatically based on patient selection in Patient Portal.)     I evaluated the questionnaire submission on 12/07/2023.    [facility] Peq Evisit Upper Respitatory/Cough Questionnaire    12/7/2023  8:53 AM CST - Filed by Patient   Do you agree to participate in an E-Visit? Yes   If you have any of the following symptoms, please present to your local ER or call 911:  I acknowledge   What is the main issue that you would like for your doctor to address today? Stuffy/runny nose, headache, coughing and dcratchy throat.   Are you able to take your vital signs? Yes   Systolic Blood Pressure: 118   Diastolic Blood Pressure: 81   Weight: 347   Height: 67   Pulse: 82   Temperature: 97.7   Respiration rate:    Pulse Oxygen:    Are you currently pregnant, could you be pregnant, or are you breast feeding? None of the above   What symptoms do you currently have?  Chills;  Cough;  Fatigue;  Headache;  Nasal Congestion;  Muscle or body aches;  Runny nose;  Sore throat   Describe your cough: Dry   Have you had any of the following? None of the above   Have you ever smoked? I have never smoked   Have you had a fever? No   When did your symptoms first appear? 12/6/2023   In the last two weeks, have you been in close contact with someone who has COVID-19 or the Flu? No   In the last two weeks, have you worked or volunteered in a healthcare facility or as a ? Healthcare facilities include a hospital, medical or dental clinic, long-term care facility, or nursing home No   Do you live in a long-term care facility, nursing home, group home, or homeless shelter? No   List what you have done or taken to help your symptoms. Nyquil and Tylenol   How  severe are your symptoms? Moderate   Have your symptoms improved since they first appeared? Worse   Have you taken an at home Covid test? Yes   What were the results? Negative   Have you taken a Flu test? No   Have you been fully vaccinated for COVID? (2 Pfizer, 2 Moderna or 1 Sam & Sam vaccine injections) Yes   Have you received a booster? No   Have you recieved a Flu shot? No   Do you have transportation to get tested for COVID if it is indicated and ordered for you at an [facility] location? No   Provide any information you feel is important to your history not asked above My daughter had similar symptoms last week, so I was around her. Not sure if it was the Flu.   Please attach any relevant images or files          Recent Labs Obtained:  No visits with results within 7 Day(s) from this visit.   Latest known visit with results is:   Documentation Only on 08/29/2023   Component Date Value Ref Range Status    Cologuard Result 06/03/2022 negative   Final       Encounter Diagnosis   Name Primary?    Upper respiratory tract infection, unspecified type Yes        Orders Placed This Encounter   Procedures    Throat Screen, Rapid Strep     Standing Status:   Future     Standing Expiration Date:   2/4/2025    COVID/RSV/FLU A&B PCR     Standing Status:   Future     Standing Expiration Date:   2/4/2025      Medications Ordered This Encounter   Medications    doxycycline (MONODOX) 100 MG capsule     Sig: Take 1 capsule (100 mg total) by mouth every 12 (twelve) hours. for 7 days     Dispense:  14 capsule     Refill:  0    promethazine-dextromethorphan (PROMETHAZINE-DM) 6.25-15 mg/5 mL Syrp     Sig: Take 5 mLs by mouth every 8 (eight) hours as needed (cough).     Dispense:  120 mL     Refill:  0        I ordered testing for RSV/FLU/COVID-19/Strep, she needs to have done at any [facility] Facility of her choice, including our clinic, and except for any Urgent Care Centers. Increase fluid intake. I sent an antibiotic  (Doxycycline), and cough medication (Promethazine DM) to help her symptoms. Will treat pending results. She can take over the counter Tylenol as directed for pain and she needs to continue Flonase as prescribed and listed on her medication list. If workup is negative and symptoms are refractory to treatment, will proceed with obtaining a Chest Xray. Notify M.D. or ER if symptoms persist or worsen, shortness of breath, chest pain, coughing up blood, temp >100.4, or any acute illness.         E-Visit Time Tracking:    Day 1 Time (in minutes): 7     Total Time (in minutes): 7

## 2023-12-08 ENCOUNTER — PATIENT MESSAGE (OUTPATIENT)
Dept: FAMILY MEDICINE | Facility: CLINIC | Age: 53
End: 2023-12-08
Payer: COMMERCIAL

## 2023-12-11 ENCOUNTER — PATIENT MESSAGE (OUTPATIENT)
Dept: FAMILY MEDICINE | Facility: CLINIC | Age: 53
End: 2023-12-11
Payer: COMMERCIAL

## 2023-12-13 ENCOUNTER — OFFICE VISIT (OUTPATIENT)
Dept: FAMILY MEDICINE | Facility: CLINIC | Age: 53
End: 2023-12-13
Payer: COMMERCIAL

## 2023-12-13 VITALS — WEIGHT: 293 LBS | HEIGHT: 67 IN | BODY MASS INDEX: 45.99 KG/M2

## 2023-12-13 DIAGNOSIS — E66.01 CLASS 3 SEVERE OBESITY DUE TO EXCESS CALORIES WITH SERIOUS COMORBIDITY AND BODY MASS INDEX (BMI) OF 50.0 TO 59.9 IN ADULT: Primary | ICD-10-CM

## 2023-12-13 PROCEDURE — 3044F PR MOST RECENT HEMOGLOBIN A1C LEVEL <7.0%: ICD-10-PCS | Mod: CPTII,95,, | Performed by: FAMILY MEDICINE

## 2023-12-13 PROCEDURE — 1160F RVW MEDS BY RX/DR IN RCRD: CPT | Mod: CPTII,95,, | Performed by: FAMILY MEDICINE

## 2023-12-13 PROCEDURE — 3044F HG A1C LEVEL LT 7.0%: CPT | Mod: CPTII,95,, | Performed by: FAMILY MEDICINE

## 2023-12-13 PROCEDURE — 99212 PR OFFICE/OUTPT VISIT, EST, LEVL II, 10-19 MIN: ICD-10-PCS | Mod: 95,,, | Performed by: FAMILY MEDICINE

## 2023-12-13 PROCEDURE — 4010F PR ACE/ARB THEARPY RXD/TAKEN: ICD-10-PCS | Mod: CPTII,95,, | Performed by: FAMILY MEDICINE

## 2023-12-13 PROCEDURE — 99212 OFFICE O/P EST SF 10 MIN: CPT | Mod: 95,,, | Performed by: FAMILY MEDICINE

## 2023-12-13 PROCEDURE — 1160F PR REVIEW ALL MEDS BY PRESCRIBER/CLIN PHARMACIST DOCUMENTED: ICD-10-PCS | Mod: CPTII,95,, | Performed by: FAMILY MEDICINE

## 2023-12-13 PROCEDURE — 4010F ACE/ARB THERAPY RXD/TAKEN: CPT | Mod: CPTII,95,, | Performed by: FAMILY MEDICINE

## 2023-12-13 PROCEDURE — 3008F PR BODY MASS INDEX (BMI) DOCUMENTED: ICD-10-PCS | Mod: CPTII,95,, | Performed by: FAMILY MEDICINE

## 2023-12-13 PROCEDURE — 1159F MED LIST DOCD IN RCRD: CPT | Mod: CPTII,95,, | Performed by: FAMILY MEDICINE

## 2023-12-13 PROCEDURE — 3008F BODY MASS INDEX DOCD: CPT | Mod: CPTII,95,, | Performed by: FAMILY MEDICINE

## 2023-12-13 PROCEDURE — 1159F PR MEDICATION LIST DOCUMENTED IN MEDICAL RECORD: ICD-10-PCS | Mod: CPTII,95,, | Performed by: FAMILY MEDICINE

## 2023-12-13 NOTE — PROGRESS NOTES
Patient ID: [id number]     Chief Complaint: Obesity        HPI:     This is a telemedicine note. Patient was treated using telemedicine, real time audio and video, according to [facility] protocols. IIliana M.D. , conducted the visit from the [facility]. The patient participated in the visit at a non-[facility] location selected by the patient, identified below. I am licensed in the state where the patient stated they are located. The patient stated that they understood and accepted the privacy and security risks to their information at their location. This visit is not recorded.    Patient was located at the patient's home.     Dolly Romero is a 53 y.o. female here today for a telemedicine visit.    - She is morbidly obese and has pre-diabetes and she refuses weight loss surgery, she is awaiting an appointment with a dietician. She has pre-diabetes, she was taking Wegovy, but switching to Zepbound, just started Zepbound this week, she has lost 7 pounds since last visit, she denies side effects from Mounjaro so far. She denies family history of MEN II or medullary thyroid cancer or personal history of pancreatitis, she is not trying to get pregnant.  - She has RA/drug induced immunodeficiency is controlled with Rx, no side effects, followed by Rheumatology (Dr. Pickering).   - She will get Prevnar-20 and flu vaccine from her pharmacy.   - Patient is without any other complaints today.         ----------------------------  PCOS (polycystic ovarian syndrome)     Past Surgical History:   Procedure Laterality Date    ANGIOGRAM  EXTREMITY BILATERAL Right        Review of patient's allergies indicates:  No Known Allergies    No outpatient medications have been marked as taking for the 12/13/23 encounter (Office Visit) with Iliana Pelletier MD.       Social History     Socioeconomic History    Marital status: Single   Tobacco Use    Smoking status: Never    Smokeless tobacco: Never    Substance and Sexual Activity    Alcohol use: Not Currently    Drug use: Never    Sexual activity: Yes     Partners: Male     Social Determinants of Health     Financial Resource Strain: Low Risk  (6/6/2023)    Overall Financial Resource Strain (CARDIA)     Difficulty of Paying Living Expenses: Not hard at all   Food Insecurity: No Food Insecurity (6/6/2023)    Hunger Vital Sign     Worried About Running Out of Food in the Last Year: Never true     Ran Out of Food in the Last Year: Never true   Transportation Needs: No Transportation Needs (6/6/2023)    PRAPARE - Transportation     Lack of Transportation (Medical): No     Lack of Transportation (Non-Medical): No   Physical Activity: Inactive (6/6/2023)    Exercise Vital Sign     Days of Exercise per Week: 0 days     Minutes of Exercise per Session: 0 min   Stress: No Stress Concern Present (6/6/2023)    [employer] - Occupational Stress Questionnaire     Feeling of Stress : Not at all   Social Connections: Moderately Isolated (6/6/2023)    Social Connection and Isolation Panel [NHANES]     Frequency of Communication with Friends and Family: More than three times a week     Frequency of Social Gatherings with Friends and Family: Once a week     Attends Yazdanism Services: More than 4 times per year     Active Member of Clubs or Organizations: No     Attends Club or Organization Meetings: Never     Marital Status: Never    Housing Stability: Low Risk  (6/6/2023)    Housing Stability Vital Sign     Unable to Pay for Housing in the Last Year: No     Number of Places Lived in the Last Year: 1     Unstable Housing in the Last Year: No        Family History   Problem Relation Age of Onset    Hypertension Mother     Diabetes Mother     Arthritis Mother     Pancreatic cancer Mother     Heart disease Father     Prostate cancer Father         Subjective:       See HPI for details    Constitutional: Denies Change in appetite. Denies Chills.  "Denies Fever. Denies Night sweats.  Eye: Denies Blurred vision. Denies Discharge. Denies Eye pain.  ENT: Denies Decreased hearing. Denies Sore throat. Denies Swollen glands.  Respiratory: Denies Cough. Denies Shortness of breath. Denies Shortness of breath with exertion. Denies Wheezing.  Cardiovascular: Denies Chest pain at rest. Denies Chest pain with exertion. Denies Irregular heartbeat. Denies Palpitations.  Gastrointestinal: Denies Abdominal pain. Denies Diarrhea. Denies Nausea. Denies Vomiting. Denies Hematemesis or Hematochezia.  Genitourinary: Denies Dysuria. Denies Urinary frequency. Denies Urinary urgency. Denies Blood in urine.  Endocrine: Denies Cold intolerance. Denies Excessive thirst. Denies Heat intolerance. Denies Weight loss. Denies Weight gain.  Musculoskeletal: Denies Painful joints. Denies Weakness.  Integumentary: Denies Rash. Denies Itching. Denies Dry skin.  Neurologic: Denies Dizziness. Denies Fainting. Denies Headache.  Psychiatric: Denies Depression. Denies Anxiety. Denies Suicidal/Homicidal ideations.    All Other ROS: Negative except as stated in HPI.   Answers submitted by the patient for this visit:  Review of Systems Questionnaire (Submitted on 12/13/2023)  activity change: No  unexpected weight change: No  neck pain: No  hearing loss: No  rhinorrhea: No  trouble swallowing: No  eye discharge: No  visual disturbance: No  chest tightness: No  wheezing: No  chest pain: No  palpitations: No  blood in stool: No  constipation: No  vomiting: No  diarrhea: No  polydipsia: No  polyuria: No  difficulty urinating: No  hematuria: No  menstrual problem: No  dysuria: No  joint swelling: No  arthralgias: No  headaches: No  weakness: No  confusion: No  dysphoric mood: No      Objective:     Ht 5' 7" (1.702 m)   Wt (!) 157.9 kg (348 lb)   BMI 54.50 kg/m²     Physical Exam    Physical Exam: LIMITED DUE TO TELEMEDICINE RESTRICTIONS.  General: Alert and oriented, No acute distress. Obese.   Head: " Normocephalic, Atraumatic.  Eye: Sclera non-icteric.  Neck/Thyroid:  Full range of motion.  Respiratory: Non-labored respirations, Symmetrical chest wall expansion.  Musculoskeletal: Normal range of motion.  Integumentary: Warm, Dry, Intact, No visible suspicious lesions or rashes. No diaphoresis.   Neurologic: No focal deficits  Psychiatric: Normal interaction, Coherent speech, Euthymic mood, Appropriate affect         Assessment:       ICD-10-CM ICD-9-CM   1. Class 3 severe obesity due to excess calories with serious comorbidity and body mass index (BMI) of 50.0 to 59.9 in adult  E66.01 278.01    Z68.43 V85.43        Plan:     Problem List Items Addressed This Visit    None  Visit Diagnoses       Class 3 severe obesity due to excess calories with serious comorbidity and body mass index (BMI) of 50.0 to 59.9 in adult    -  Primary         1. Class 3 severe obesity due to excess calories with serious comorbidity and body mass index (BMI) of 50.0 to 59.9 in adult  - Diet, exercise, and 10% weight loss encouraged. Continue Zepbound 2.5mg weekly with close monitoring to help with pre-diabetes and obesity. She agrees to avoid pregnancy with Zepbound. Will titrate Rx Zepbound as needed/tolerated until max dose achieved. Recheck CMP, HgA1C with annual wellness exam. Notify M.D. or ER if symptoms persist or worsen, adverse Rx side effects, pancreatitis, temp greater than 100.4, or any acute illness.     Body mass index is 54.5 kg/m².  Goal BMI <30.  Exercise 5 times a week for 30 minutes per day.  Avoid soda, simple sugars, excessive rice, potatoes or bread. Limit fast foods and fried foods.  Choose complex carbs in moderation (example: green vegetables, beans, oatmeal). Eat plenty of fresh fruits and vegetables with lean meats daily.  Do not skip meals. Eat a balanced portion size.  Avoid fad diets. Consider permanent healthy life style changes.        Dolly was seen today for obesity.    Diagnoses and all orders for  this visit:    Class 3 severe obesity due to excess calories with serious comorbidity and body mass index (BMI) of 50.0 to 59.9 in adult          Medication List with Changes/Refills   Current Medications    ALBUTEROL (PROVENTIL/VENTOLIN HFA) 90 MCG/ACTUATION INHALER    Inhale 2 puffs into the lungs every 6 (six) hours as needed.       Start Date: 4/3/2023  End Date: --    ASPIRIN 81 MG CAP    Take 1 tablet by mouth once daily.       Start Date: --        End Date: --    CELECOXIB (CELEBREX) 200 MG CAPSULE    Take 1 capsule (200 mg total) by mouth after lunch.       Start Date: 9/18/2023 End Date: --    CHOLECALCIFEROL, VITAMIN D3, (VITAMIN D3) 50 MCG (2,000 UNIT) TAB    Take 2,000 Units by mouth once daily.       Start Date: --        End Date: --    DICLOFENAC (VOLTAREN) 50 MG EC TABLET    Take 50 mg by mouth 2 (two) times a day.       Start Date: 5/29/2023 End Date: --    DICLOFENAC SODIUM (VOLTAREN) 1 % GEL    Apply 4 g topically 2 (two) times a day.       Start Date: 5/30/2023 End Date: --    DOXYCYCLINE (MONODOX) 100 MG CAPSULE    Take 1 capsule (100 mg total) by mouth every 12 (twelve) hours. for 7 days       Start Date: 12/7/2023 End Date: 12/14/2023    FEROSUL 325 MG (65 MG IRON) TAB TABLET    Take 325 mg by mouth every other day.       Start Date: 4/21/2023 End Date: --    FLUTICASONE PROPIONATE (FLONASE) 50 MCG/ACTUATION NASAL SPRAY    1 spray (50 mcg total) by Each Nostril route 2 (two) times a day.       Start Date: 5/8/2023  End Date: --    HYDROCHLOROTHIAZIDE (HYDRODIURIL) 25 MG TABLET    Take 1 tablet (25 mg total) by mouth once daily.       Start Date: 6/19/2023 End Date: --    HYDROXYCHLOROQUINE (PLAQUENIL) 200 MG TABLET    Take 1 tablet (200 mg total) by mouth 2 (two) times daily. After food       Start Date: 9/18/2023 End Date: 3/20/2024    LISINOPRIL 10 MG TABLET    Take 1 tablet (10 mg total) by mouth every morning.       Start Date: 6/19/2023 End Date: --    MAGNESIUM 30 MG TAB    Take 30 mg  by mouth 2 (two) times a day.       Start Date: --        End Date: --    METFORMIN (GLUCOPHAGE) 500 MG TABLET    Take 500 mg by mouth once daily.       Start Date: 8/29/2023 End Date: --    OMEPRAZOLE (PRILOSEC) 40 MG CAPSULE    Take 1 capsule (40 mg total) by mouth once daily. Before lunch       Start Date: 9/18/2023 End Date: 9/17/2024    PROMETHAZINE-DEXTROMETHORPHAN (PROMETHAZINE-DM) 6.25-15 MG/5 ML SYRP    Take 5 mLs by mouth every 8 (eight) hours as needed (cough).       Start Date: 12/7/2023 End Date: --    SPIRONOLACTONE (ALDACTONE) 50 MG TABLET    Take 50 mg by mouth 2 (two) times daily.       Start Date: 5/4/2023  End Date: --    TIRZEPATIDE, WEIGHT LOSS, 2.5 MG/0.5 ML PNIJ    Inject 2.5 mg into the skin every 7 days.       Start Date: 12/5/2023 End Date: --    TIZANIDINE (ZANAFLEX) 4 MG TABLET    Take 1 tablet (4 mg total) by mouth nightly.       Start Date: 9/18/2023 End Date: 3/16/2024          Follow up in about 4 weeks (around 1/10/2024) for Obesity Followup, Virtual Visit.      Audio/Video Time Documentation:  Spent 12 minutes for telemedicine visit with successful audio/visual connection. [facility] was used for billing.

## 2023-12-20 DIAGNOSIS — M05.9 SEROPOSITIVE RHEUMATOID ARTHRITIS: Primary | ICD-10-CM

## 2024-01-02 ENCOUNTER — PATIENT MESSAGE (OUTPATIENT)
Dept: FAMILY MEDICINE | Facility: CLINIC | Age: 54
End: 2024-01-02

## 2024-01-02 ENCOUNTER — E-VISIT (OUTPATIENT)
Dept: FAMILY MEDICINE | Facility: CLINIC | Age: 54
End: 2024-01-02
Payer: COMMERCIAL

## 2024-01-02 DIAGNOSIS — E66.01 CLASS 3 SEVERE OBESITY DUE TO EXCESS CALORIES WITH SERIOUS COMORBIDITY AND BODY MASS INDEX (BMI) OF 50.0 TO 59.9 IN ADULT: Primary | ICD-10-CM

## 2024-01-02 PROCEDURE — 99421 OL DIG E/M SVC 5-10 MIN: CPT | Mod: ,,, | Performed by: FAMILY MEDICINE

## 2024-01-02 NOTE — PROGRESS NOTES
Patient ID: Dolly Romero is a 53 y.o. female.    Chief Complaint: Back Pain (Entered automatically based on patient selection in Patient Portal.)    The patient initiated a request through [facility] on 1/2/2024 for evaluation and management with a chief complaint of Back Pain (Entered automatically based on patient selection in Patient Portal.)     I evaluated the questionnaire submission on 01/02/2024.    Ohs Peq Evisit Back Pain    1/2/2024  9:43 AM CST - Filed by Patient   Do you agree to participate in an E-Visit? Yes   If you have any of the following symptoms, please present to your local ER or call 911: I acknowledge   What is the main issue that you would like for your doctor to address today? Prescription sent in for Zepbound 5mg sent to [facility] on [address].   Are you able to take your vital signs? Yes   Systolic Blood Pressure: 127   Diastolic Blood Pressure: 74   Weight:    Height: 67   Pulse: 87   Temperature:    Respiration rate:    Pulse Oxygen:    Are you currently pregnant, could you be pregnant, or are you breast feeding? None of the above   Where are you having pain? Lower Back   Does the pain extend into your legs? Yes, into both legs   How bad is the pain? The pain is mild   Did you have an injury that caused the pain? No, I cannot remember an injury   How long has the pain been present? More than 2 days but less than 1 week   Have you had back pain in the past? I have never had serious back pain before   Do you have a fever? No, I do not have a fever   Do you have any of the following? None of the above   What makes the pain worse? None of the above   What makes the pain better? Lying in bed   Have you ever been diagnosed with cancer? No   Have you ever been diagnosed with degenerative disc disease (arthritis of the spine)? Yes   Have you ever been diagnosed with osteoporosis or any other bone weakness? Yes   Have you ever had surgery on your back or spine? No   What is your usual  health status? My activity is physically restricted   Provide any information you feel is important to your history not asked above    Please attach any relevant images or files          Recent Labs Obtained:  No visits with results within 7 Day(s) from this visit.   Latest known visit with results is:   Documentation Only on 08/29/2023   Component Date Value Ref Range Status    Cologuard Result 06/03/2022 negative   Final       Encounter Diagnosis   Name Primary?    Class 3 severe obesity due to excess calories with serious comorbidity and body mass index (BMI) of 50.0 to 59.9 in adult Yes        No orders of the defined types were placed in this encounter.     Medications Ordered This Encounter   Medications    tirzepatide, weight loss, 5 mg/0.5 mL PnIj     Sig: Inject 5 mg into the skin every 7 days.     Dispense:  4 Pen     Refill:  1     Dose increase. Thanks.        I sent a prescription for an increased dose of your Zepbound to 5mg weekly with close monitoring. She agrees to avoid pregnancy with Zepbound. Will titrate Rx Zepbound as needed/tolerated until max dose achieved, will need an appointment every 4 weeks for followup. Recheck CMP, HgA1C with annual wellness exam. Notify M.D. or ER if symptoms persist or worsen, adverse Rx side effects, pancreatitis, temp greater than 100.4, or any acute illness.      There is no height or weight on file to calculate BMI.  Goal BMI <30.  Exercise 5 times a week for 30 minutes per day.  Avoid soda, simple sugars, excessive rice, potatoes or bread. Limit fast foods and fried foods.  Choose complex carbs in moderation (example: green vegetables, beans, oatmeal). Eat plenty of fresh fruits and vegetables with lean meats daily.  Do not skip meals. Eat a balanced portion size.  Avoid fad diets. Consider permanent healthy life style changes.        E-Visit Time Tracking:    Day 1 Time (in minutes): 8     Total Time (in minutes): 8

## 2024-01-17 ENCOUNTER — TELEPHONE (OUTPATIENT)
Dept: RHEUMATOLOGY | Facility: CLINIC | Age: 54
End: 2024-01-17

## 2024-02-01 ENCOUNTER — LAB VISIT (OUTPATIENT)
Dept: LAB | Facility: HOSPITAL | Age: 54
End: 2024-02-01
Attending: OBSTETRICS & GYNECOLOGY
Payer: COMMERCIAL

## 2024-02-01 ENCOUNTER — OFFICE VISIT (OUTPATIENT)
Dept: FAMILY MEDICINE | Facility: CLINIC | Age: 54
End: 2024-02-01
Payer: COMMERCIAL

## 2024-02-01 VITALS — WEIGHT: 293 LBS | HEIGHT: 67 IN | BODY MASS INDEX: 45.99 KG/M2

## 2024-02-01 DIAGNOSIS — Z79.899 DRUG-INDUCED IMMUNODEFICIENCY: ICD-10-CM

## 2024-02-01 DIAGNOSIS — R11.0 NAUSEA: ICD-10-CM

## 2024-02-01 DIAGNOSIS — M05.9 SEROPOSITIVE RHEUMATOID ARTHRITIS: ICD-10-CM

## 2024-02-01 DIAGNOSIS — E66.01 MORBID OBESITY: Primary | ICD-10-CM

## 2024-02-01 DIAGNOSIS — E66.01 CLASS 3 SEVERE OBESITY DUE TO EXCESS CALORIES WITH SERIOUS COMORBIDITY AND BODY MASS INDEX (BMI) OF 50.0 TO 59.9 IN ADULT: Primary | ICD-10-CM

## 2024-02-01 DIAGNOSIS — D84.821 DRUG-INDUCED IMMUNODEFICIENCY: ICD-10-CM

## 2024-02-01 PROBLEM — E66.813 CLASS 3 SEVERE OBESITY DUE TO EXCESS CALORIES WITH SERIOUS COMORBIDITY AND BODY MASS INDEX (BMI) OF 50.0 TO 59.9 IN ADULT: Status: ACTIVE | Noted: 2024-02-01

## 2024-02-01 LAB
FSH SERPL-ACNC: 9.47 MIU/ML
PROLACTIN LEVEL (OLG): 10.7 NG/ML (ref 5.18–26.53)

## 2024-02-01 PROCEDURE — 3008F BODY MASS INDEX DOCD: CPT | Mod: CPTII,95,, | Performed by: FAMILY MEDICINE

## 2024-02-01 PROCEDURE — 1160F RVW MEDS BY RX/DR IN RCRD: CPT | Mod: CPTII,95,, | Performed by: FAMILY MEDICINE

## 2024-02-01 PROCEDURE — 99213 OFFICE O/P EST LOW 20 MIN: CPT | Mod: 95,,, | Performed by: FAMILY MEDICINE

## 2024-02-01 PROCEDURE — 83001 ASSAY OF GONADOTROPIN (FSH): CPT

## 2024-02-01 PROCEDURE — 4010F ACE/ARB THERAPY RXD/TAKEN: CPT | Mod: CPTII,95,, | Performed by: FAMILY MEDICINE

## 2024-02-01 PROCEDURE — 1159F MED LIST DOCD IN RCRD: CPT | Mod: CPTII,95,, | Performed by: FAMILY MEDICINE

## 2024-02-01 PROCEDURE — 84146 ASSAY OF PROLACTIN: CPT

## 2024-02-01 PROCEDURE — 36415 COLL VENOUS BLD VENIPUNCTURE: CPT

## 2024-02-01 RX ORDER — TIRZEPATIDE 7.5 MG/.5ML
7.5 INJECTION, SOLUTION SUBCUTANEOUS
Qty: 4 PEN | Refills: 1 | Status: SHIPPED | OUTPATIENT
Start: 2024-02-01 | End: 2024-02-28

## 2024-02-01 RX ORDER — ONDANSETRON 4 MG/1
4 TABLET, ORALLY DISINTEGRATING ORAL EVERY 8 HOURS PRN
Qty: 30 TABLET | Refills: 0 | Status: SHIPPED | OUTPATIENT
Start: 2024-02-01 | End: 2024-04-18 | Stop reason: SDUPTHER

## 2024-02-01 NOTE — PROGRESS NOTES
Patient ID: [id number]     Chief Complaint: Obesity        HPI:     This is a telemedicine note. Patient was treated using telemedicine, real time audio and video, according to [facility] protocols. IIliana M.D. , conducted the visit from the [facility]. The patient participated in the visit at a non-[facility] location selected by the patient, identified below. I am licensed in the state where the patient stated they are located. The patient stated that they understood and accepted the privacy and security risks to their information at their location. This visit is not recorded.    Patient was located at the patient's car.     Dolly Romero is a 53 y.o. female here today for a telemedicine visit.    - She is morbidly obese and has pre-diabetes and she refuses weight loss surgery, she is awaiting an appointment with a dietician. She has pre-diabetes, she is taking Zepbound, she has lost 11 pounds since last visit, she reports mild nausea, no vomiting, would like Rx for nausea, would like to proceed with increased dose of Zepbound. She denies family history of MEN II or medullary thyroid cancer or personal history of pancreatitis, she is not trying to get pregnant.  - She has RA/drug induced immunodeficiency is controlled with Rx, no side effects, followed by Rheumatology (Dr. Thompson).   - Patient is without any other complaints today.         ----------------------------  PCOS (polycystic ovarian syndrome)     Past Surgical History:   Procedure Laterality Date    ANGIOGRAM  EXTREMITY BILATERAL Right        Review of patient's allergies indicates:  No Known Allergies    No outpatient medications have been marked as taking for the 2/1/24 encounter (Office Visit) with Iliana Pelletier MD.       Social History     Socioeconomic History    Marital status: Single   Tobacco Use    Smoking status: Never    Smokeless tobacco: Never   Substance and Sexual Activity    Alcohol use: Not  Currently    Drug use: Never    Sexual activity: Yes     Partners: Male     Social Determinants of Health     Financial Resource Strain: Medium Risk (1/31/2024)    Overall Financial Resource Strain (CARDIA)     Difficulty of Paying Living Expenses: Somewhat hard   Food Insecurity: No Food Insecurity (1/31/2024)    Hunger Vital Sign     Worried About Running Out of Food in the Last Year: Never true     Ran Out of Food in the Last Year: Never true   Transportation Needs: No Transportation Needs (1/31/2024)    PRAPARE - Transportation     Lack of Transportation (Medical): No     Lack of Transportation (Non-Medical): No   Physical Activity: Insufficiently Active (1/31/2024)    Exercise Vital Sign     Days of Exercise per Week: 4 days     Minutes of Exercise per Session: 30 min   Stress: Stress Concern Present (1/31/2024)    [employer] - Occupational Stress Questionnaire     Feeling of Stress : To some extent   Social Connections: Socially Isolated (1/31/2024)    Social Connection and Isolation Panel [NHANES]     Frequency of Communication with Friends and Family: Twice a week     Frequency of Social Gatherings with Friends and Family: Never     Attends Hinduism Services: More than 4 times per year     Active Member of Clubs or Organizations: No     Attends Club or Organization Meetings: Never     Marital Status: Never    Housing Stability: Low Risk  (1/31/2024)    Housing Stability Vital Sign     Unable to Pay for Housing in the Last Year: No     Number of Places Lived in the Last Year: 1     Unstable Housing in the Last Year: No        Family History   Problem Relation Age of Onset    Hypertension Mother     Diabetes Mother     Arthritis Mother     Pancreatic cancer Mother     Heart disease Father     Prostate cancer Father         Subjective:       See HPI for details    Constitutional: Denies Change in appetite. Denies Chills. Denies Fever. Denies Night sweats.  Eye: Denies  "Blurred vision. Denies Discharge. Denies Eye pain.  ENT: Denies Decreased hearing. Denies Sore throat. Denies Swollen glands.  Respiratory: Denies Cough. Denies Shortness of breath. Denies Shortness of breath with exertion. Denies Wheezing.  Cardiovascular: Denies Chest pain at rest. Denies Chest pain with exertion. Denies Irregular heartbeat. Denies Palpitations.  Gastrointestinal: Denies Abdominal pain. Denies Diarrhea. Reports Nausea. Denies Vomiting. Denies Hematemesis or Hematochezia.  Genitourinary: Denies Dysuria. Denies Urinary frequency. Denies Urinary urgency. Denies Blood in urine.  Endocrine: Denies Cold intolerance. Denies Excessive thirst. Denies Heat intolerance. Denies Weight loss. Denies Weight gain.  Musculoskeletal: Denies Painful joints. Denies Weakness.  Integumentary: Denies Rash. Denies Itching. Denies Dry skin.  Neurologic: Denies Dizziness. Denies Fainting. Denies Headache.  Psychiatric: Denies Depression. Denies Anxiety. Denies Suicidal/Homicidal ideations.    All Other ROS: Negative except as stated in HPI.   Answers submitted by the patient for this visit:  Review of Systems Questionnaire (Submitted on 1/31/2024)  activity change: No  unexpected weight change: No  neck pain: No  hearing loss: No  rhinorrhea: No  trouble swallowing: No  eye discharge: No  visual disturbance: No  chest tightness: No  wheezing: No  chest pain: No  palpitations: No  blood in stool: No  constipation: No  vomiting: No  diarrhea: No  polydipsia: No  polyuria: No  difficulty urinating: No  hematuria: No  menstrual problem: No  dysuria: No  joint swelling: No  arthralgias: No  headaches: No  weakness: No  confusion: No  dysphoric mood: No      Objective:     Ht 5' 7" (1.702 m)   Wt (!) 152.9 kg (337 lb)   BMI 52.78 kg/m²     Physical Exam    Physical Exam: LIMITED DUE TO TELEMEDICINE RESTRICTIONS.  General: Alert and oriented, No acute distress. Obese.   Head: Normocephalic, Atraumatic.  Eye: Sclera " non-icteric.  Neck/Thyroid:  Full range of motion.  Respiratory: Non-labored respirations, Symmetrical chest wall expansion.  Musculoskeletal: Normal range of motion.  Integumentary: Warm, Dry, Intact, No visible suspicious lesions or rashes. No diaphoresis.   Neurologic: No focal deficits  Psychiatric: Normal interaction, Coherent speech, Euthymic mood, Appropriate affect       Assessment:       ICD-10-CM ICD-9-CM   1. Class 3 severe obesity due to excess calories with serious comorbidity and body mass index (BMI) of 50.0 to 59.9 in adult  E66.01 278.01    Z68.43 V85.43   2. Nausea  R11.0 787.02   3. Seropositive rheumatoid arthritis  M05.9 714.0   4. Drug-induced immunodeficiency  D84.821 279.3    Z79.899 E947.9        Plan:     Problem List Items Addressed This Visit          Immunology/Multi System    Seropositive rheumatoid arthritis    Drug-induced immunodeficiency       Endocrine    Class 3 severe obesity due to excess calories with serious comorbidity and body mass index (BMI) of 50.0 to 59.9 in adult - Primary    Relevant Medications    tirzepatide, weight loss, (ZEPBOUND) 7.5 mg/0.5 mL PnIj     Other Visit Diagnoses       Nausea        Relevant Medications    ondansetron (ZOFRAN-ODT) 4 MG TbDL         1. Class 3 severe obesity due to excess calories with serious comorbidity and body mass index (BMI) of 50.0 to 59.9 in adult  - tirzepatide, weight loss, (ZEPBOUND) 7.5 mg/0.5 mL PnIj; Inject 7.5 mg into the skin every 7 days.  Dispense: 4 Pen; Refill: 1  - Diet, exercise, and 10% weight loss encouraged. Continue Zepbound 7.5mg weekly with close monitoring to help with pre-diabetes and obesity. She agrees to avoid pregnancy with Zepbound. Will titrate Rx Zepbound as needed/tolerated until max dose achieved. Recheck CMP, HgA1C with annual wellness exam. Notify M.D. or ER if symptoms persist or worsen, adverse Rx side effects, pancreatitis, temp greater than 100.4, or any acute illness.     Body mass index is  52.78 kg/m².  Goal BMI <30.  Exercise 5 times a week for 30 minutes per day.  Avoid soda, simple sugars, excessive rice, potatoes or bread. Limit fast foods and fried foods.  Choose complex carbs in moderation (example: green vegetables, beans, oatmeal). Eat plenty of fresh fruits and vegetables with lean meats daily.  Do not skip meals. Eat a balanced portion size.  Avoid fad diets. Consider permanent healthy life style changes.      2. Nausea  - Rx trial of ondansetron (ZOFRAN-ODT) 4 MG TbDL; Take 1 tablet (4 mg total) by mouth every 8 (eight) hours as needed (nausea).  Dispense: 30 tablet; Refill: 0  - Notify M.D. or ER if symptoms persist or worsen, abdominal pain, vomiting, bloody stools, temp >100.4, or any acute illness.      3. Seropositive rheumatoid arthritis  - Stable, continue followup with Rheumatology as scheduled, continue current treatment plan.     4. Drug-induced immunodeficiency  - Stable, continue followup with Rheumatology as scheduled, continue current treatment plan.       Dolly was seen today for obesity.    Diagnoses and all orders for this visit:    Class 3 severe obesity due to excess calories with serious comorbidity and body mass index (BMI) of 50.0 to 59.9 in adult  -     tirzepatide, weight loss, (ZEPBOUND) 7.5 mg/0.5 mL PnIj; Inject 7.5 mg into the skin every 7 days.    Nausea  -     ondansetron (ZOFRAN-ODT) 4 MG TbDL; Take 1 tablet (4 mg total) by mouth every 8 (eight) hours as needed (nausea).    Seropositive rheumatoid arthritis    Drug-induced immunodeficiency          Medication List with Changes/Refills   New Medications    ONDANSETRON (ZOFRAN-ODT) 4 MG TBDL    Take 1 tablet (4 mg total) by mouth every 8 (eight) hours as needed (nausea).       Start Date: 2/1/2024  End Date: --    TIRZEPATIDE, WEIGHT LOSS, (ZEPBOUND) 7.5 MG/0.5 ML PNIJ    Inject 7.5 mg into the skin every 7 days.       Start Date: 2/1/2024  End Date: 4/1/2024   Current Medications    ALBUTEROL  (PROVENTIL/VENTOLIN HFA) 90 MCG/ACTUATION INHALER    Inhale 2 puffs into the lungs every 6 (six) hours as needed.       Start Date: 4/3/2023  End Date: --    ASPIRIN 81 MG CAP    Take 1 tablet by mouth once daily.       Start Date: --        End Date: --    CELECOXIB (CELEBREX) 200 MG CAPSULE    Take 1 capsule (200 mg total) by mouth after lunch.       Start Date: 9/18/2023 End Date: --    CHOLECALCIFEROL, VITAMIN D3, (VITAMIN D3) 50 MCG (2,000 UNIT) TAB    Take 2,000 Units by mouth once daily.       Start Date: --        End Date: --    DICLOFENAC (VOLTAREN) 50 MG EC TABLET    Take 50 mg by mouth 2 (two) times a day.       Start Date: 5/29/2023 End Date: --    DICLOFENAC SODIUM (VOLTAREN) 1 % GEL    Apply 4 g topically 2 (two) times a day.       Start Date: 5/30/2023 End Date: --    FEROSUL 325 MG (65 MG IRON) TAB TABLET    Take 325 mg by mouth every other day.       Start Date: 4/21/2023 End Date: --    FLUTICASONE PROPIONATE (FLONASE) 50 MCG/ACTUATION NASAL SPRAY    1 spray (50 mcg total) by Each Nostril route 2 (two) times a day.       Start Date: 5/8/2023  End Date: --    HYDROCHLOROTHIAZIDE (HYDRODIURIL) 25 MG TABLET    Take 1 tablet (25 mg total) by mouth once daily.       Start Date: 6/19/2023 End Date: --    HYDROXYCHLOROQUINE (PLAQUENIL) 200 MG TABLET    Take 1 tablet (200 mg total) by mouth 2 (two) times daily. After food       Start Date: 9/18/2023 End Date: 3/20/2024    LISINOPRIL 10 MG TABLET    Take 1 tablet (10 mg total) by mouth every morning.       Start Date: 6/19/2023 End Date: --    MAGNESIUM 30 MG TAB    Take 30 mg by mouth 2 (two) times a day.       Start Date: --        End Date: --    METFORMIN (GLUCOPHAGE) 500 MG TABLET    Take 500 mg by mouth once daily.       Start Date: 8/29/2023 End Date: --    OMEPRAZOLE (PRILOSEC) 40 MG CAPSULE    Take 1 capsule (40 mg total) by mouth once daily. Before lunch       Start Date: 9/18/2023 End Date: 9/17/2024    SPIRONOLACTONE (ALDACTONE) 50 MG TABLET     Take 50 mg by mouth 2 (two) times daily.       Start Date: 5/4/2023  End Date: --    TIZANIDINE (ZANAFLEX) 4 MG TABLET    Take 1 tablet (4 mg total) by mouth nightly.       Start Date: 9/18/2023 End Date: 3/16/2024   Discontinued Medications    PROMETHAZINE-DEXTROMETHORPHAN (PROMETHAZINE-DM) 6.25-15 MG/5 ML SYRP    Take 5 mLs by mouth every 8 (eight) hours as needed (cough).       Start Date: 12/7/2023 End Date: 2/1/2024    TIRZEPATIDE, WEIGHT LOSS, 5 MG/0.5 ML PNIJ    Inject 5 mg into the skin every 7 days.       Start Date: 1/2/2024  End Date: 2/1/2024          Follow up in about 4 weeks (around 2/29/2024) for Obesity Followup, Virtual Visit.      Audio/Video Time Documentation:  Spent 10 minutes for telemedicine visit with successful audio/visual connection. [facility] was used for billing.

## 2024-02-28 ENCOUNTER — E-VISIT (OUTPATIENT)
Dept: FAMILY MEDICINE | Facility: CLINIC | Age: 54
End: 2024-02-28
Payer: COMMERCIAL

## 2024-02-28 DIAGNOSIS — E66.01 CLASS 3 SEVERE OBESITY DUE TO EXCESS CALORIES WITH SERIOUS COMORBIDITY AND BODY MASS INDEX (BMI) OF 50.0 TO 59.9 IN ADULT: Primary | ICD-10-CM

## 2024-02-28 PROCEDURE — 99421 OL DIG E/M SVC 5-10 MIN: CPT | Mod: ,,, | Performed by: FAMILY MEDICINE

## 2024-02-28 RX ORDER — TIRZEPATIDE 5 MG/.5ML
5 INJECTION, SOLUTION SUBCUTANEOUS
Qty: 4 PEN | Refills: 5 | Status: SHIPPED | OUTPATIENT
Start: 2024-02-28 | End: 2024-03-25

## 2024-02-28 NOTE — PROGRESS NOTES
Patient ID: Dolly Romero is a 53 y.o. female.    Chief Complaint: GI Problem (Entered automatically based on patient selection in Patient Portal.)    The patient initiated a request through [employer] ( [employer] ) on 2/28/2024 for evaluation and management with a chief complaint of GI Problem (Entered automatically based on patient selection in Patient Portal.)     I evaluated the questionnaire submission on 02/28/2024.    Ohs Peq Evisit Diarrhea    2/28/2024  1:25 PM CST - Filed by Patient   Do you agree to participate in an E-Visit? Yes   If you have any of the following symptoms, please present to your local ER or call 911:  I acknowledge   What is the main issue that you would like for your doctor to address today? I would like to go back down to Zepbound 5mg. I had nausea/vomiting/diarrhea/headache that lasted 3 to 4 days on 7.5, after taken last Wednesday. That was the second dose, first one wasnt that bad.   Are you able to take your vital signs? Yes   Systolic Blood Pressure: 132   Diastolic Blood Pressure: 89   Weight: 331   Height:    Pulse:    Temperature:    Respiration rate:    Pulse Oxygen:    Are you currently pregnant, could you be pregnant, or are you breast feeding? None of the above   Do you have diarrhea? No   Are you vomiting? No, I am not vomiting   Do you have belly pain? I have a little pain or no pain   Are you feeling dizzy or like you might pass out? No   When did your symptoms begin? 2/22/2024   Do you have a fever? No, I do not have a fever   Are you having trouble walking or lifting yourself due to weakness from this illness?  No   Do any of the following apply to you? My urine is normal   Did your condition begin after a specific meal that may have caused the illness? Not clearly related to a meal.    Have you taken antibiotics recently? I have not been on any antibiotics   Have you been hospitalized in the past 2 months? No, I have not been hospitalized recently.   Do you work in a child  care center or healthcare environment? No   Does anyone you know have similar symptoms? No   Have you had a meal consisting of raw meat or fish in the week prior to your illness? No   Have you recently travelled to a place where you may have caught an illness? No   Have you tried any medication or other treatment for your symptoms? Yes   Please list the treatments you tried, and the result of those treatments. Pepto Bismol and Immodium   Provide any information you feel is important to your history not asked above I am better now, it seemed to be a reaction right after I took the Zepbound 7.5. I would like to gwt Zepbound 5mg callesd in to [facility] on [address] of possible.   Please attach any relevant images or files          Encounter Diagnosis   Name Primary?    Class 3 severe obesity due to excess calories with serious comorbidity and body mass index (BMI) of 50.0 to 59.9 in adult Yes        No orders of the defined types were placed in this encounter.     Medications Ordered This Encounter   Medications    tirzepatide, weight loss, (ZEPBOUND) 5 mg/0.5 mL PnIj     Sig: Inject 5 mg into the skin every 7 days.     Dispense:  4 Pen     Refill:  5     Dose decrease. Thanks.      I sent a prescription for a decreased dose of your Zepbound to 5mg weekly with close monitoring. She agrees to avoid pregnancy with Zepbound. Will titrate Rx Zepbound as needed/tolerated until max dose achieved, will need an appointment every 3 months for followup. Recheck CMP, HgA1C with annual wellness exam. Notify M.D. or ER if symptoms persist or worsen, adverse Rx side effects, pancreatitis, temp greater than 100.4, or any acute illness.       There is no height or weight on file to calculate BMI.  Goal BMI <30.  Exercise 5 times a week for 30 minutes per day.  Avoid soda, simple sugars, excessive rice, potatoes or bread. Limit fast foods and fried foods.  Choose complex carbs in moderation (example: green vegetables, beans,  oatmeal). Eat plenty of fresh fruits and vegetables with lean meats daily.  Do not skip meals. Eat a balanced portion size.  Avoid fad diets. Consider permanent healthy life style changes.      Follow up if symptoms worsen or fail to improve.      E-Visit Time Tracking:    Day 1 Time (in minutes): 5    Total Time (in minutes): 5

## 2024-03-05 ENCOUNTER — TELEPHONE (OUTPATIENT)
Dept: RHEUMATOLOGY | Facility: CLINIC | Age: 54
End: 2024-03-05
Payer: COMMERCIAL

## 2024-03-05 NOTE — TELEPHONE ENCOUNTER
Patient called regarding a referral to Dr. Verdugo.   I did resend the referral via fax. Patient has been advised.   The confirmation will be scanned into the patients chart.

## 2024-03-24 DIAGNOSIS — E66.01 CLASS 3 SEVERE OBESITY DUE TO EXCESS CALORIES WITH SERIOUS COMORBIDITY AND BODY MASS INDEX (BMI) OF 50.0 TO 59.9 IN ADULT: ICD-10-CM

## 2024-03-25 RX ORDER — TIRZEPATIDE 5 MG/.5ML
INJECTION, SOLUTION SUBCUTANEOUS
Qty: 4 PEN | Refills: 2 | Status: SHIPPED | OUTPATIENT
Start: 2024-03-25 | End: 2024-04-18

## 2024-03-28 ENCOUNTER — E-VISIT (OUTPATIENT)
Dept: FAMILY MEDICINE | Facility: CLINIC | Age: 54
End: 2024-03-28
Payer: COMMERCIAL

## 2024-03-28 DIAGNOSIS — K04.7 DENTAL ABSCESS: Primary | ICD-10-CM

## 2024-03-28 PROCEDURE — 99421 OL DIG E/M SVC 5-10 MIN: CPT | Mod: ,,, | Performed by: FAMILY MEDICINE

## 2024-03-28 RX ORDER — HYDROCODONE BITARTRATE AND ACETAMINOPHEN 5; 325 MG/1; MG/1
1 TABLET ORAL EVERY 8 HOURS PRN
Qty: 15 TABLET | Refills: 0 | Status: SHIPPED | OUTPATIENT
Start: 2024-03-28 | End: 2024-04-18

## 2024-03-28 RX ORDER — AMOXICILLIN AND CLAVULANATE POTASSIUM 875; 125 MG/1; MG/1
1 TABLET, FILM COATED ORAL EVERY 12 HOURS
Qty: 20 TABLET | Refills: 0 | Status: SHIPPED | OUTPATIENT
Start: 2024-03-28 | End: 2024-04-18

## 2024-03-28 NOTE — PROGRESS NOTES
Patient ID: Dolly Romero is a 53 y.o. female.    Chief Complaint: Back Pain (Entered automatically based on patient selection in Patient Portal.)    The patient initiated a request through [employer] on 3/28/2024 for evaluation and management with a chief complaint of Back Pain (Entered automatically based on patient selection in Patient Portal.)     I evaluated the questionnaire submission on 03/28/2024.    Ohs Peq Evisit Back Pain    3/28/2024  5:32 AM CDT - Filed by Patient   Do you agree to participate in an E-Visit? Yes   If you have any of the following symptoms, please present to your local ER or call 911: I acknowledge   What is the main issue you would like addressed today? Upper left jaw pain, near ear, I think its an infected tooth, possible swollen gums. I do have a tooth on the left upper side that needs care, that is the side that is affected. Cant get into [facility] dentist until 4/2, old one doesnt accept my insurance.   Are you able to take your vital signs? Yes   Systolic Blood Pressure: 132   Diastolic Blood Pressure: 87   Weight: 319   Height: 67   Pulse:    Temperature:    Respiration rate:    Pulse Oxygen:    Are you pregnant, could you be pregnant, or are you breast feeding? None of the above   Where are you having pain? Upper Back   Does the pain extend into your legs? No   How bad is the pain? The pain is moderate   Did you have an injury that caused the pain? No, I cannot remember an injury   How long has the pain been present? Today and yesterday   Have you had back pain in the past? I have never had serious back pain before   Do you have a fever? No, I do not have a fever   Do you have any of the following? None of the above   What makes the pain worse? Any movement   What makes the pain better? None of the above   Have you ever been diagnosed with cancer? No   Have you ever been diagnosed with degenerative disc disease (arthritis of the spine)? Yes   Have you ever been diagnosed  with osteoporosis or any other bone weakness? Yes   Have you ever had surgery on your back or spine? No   What is your usual health status? I am active and can move normally   Provide any additional information you feel is important. I can feel the pain when I try to bite down on the left upper side of jaw. Painful to touch just in front of ear on the left upper jaw line, by the tooth that needs care.   Please attach any relevant images or files          Encounter Diagnosis   Name Primary?    Dental abscess Yes        No orders of the defined types were placed in this encounter.     Medications Ordered This Encounter   Medications    amoxicillin-clavulanate 875-125mg (AUGMENTIN) 875-125 mg per tablet     Sig: Take 1 tablet by mouth every 12 (twelve) hours. for 10 days     Dispense:  20 tablet     Refill:  0    HYDROcodone-acetaminophen (NORCO) 5-325 mg per tablet     Sig: Take 1 tablet by mouth every 8 (eight) hours as needed for Pain.     Dispense:  15 tablet     Refill:  0     Quantity prescribed more than 7 day supply? No     Order Specific Question:   I have reviewed the Prescription Drug Monitoring Program (PDMP) database for this patient prior to prescribing the above opioid medication     Answer:   Yes      I ordered an antibiotic (Augmentin), and a pain medication (Norco) to help her symptoms until she can see her dentist. Keep appointment with dentist as scheduled. Notify M.D. or ER if symptoms persist or worsen, temp >100.4, or any acute illness.         Follow up if symptoms worsen or fail to improve.      E-Visit Time Tracking:    Day 1 Time (in minutes): 8    Total Time (in minutes): 8

## 2024-04-17 DIAGNOSIS — E66.01 CLASS 3 SEVERE OBESITY DUE TO EXCESS CALORIES WITH SERIOUS COMORBIDITY AND BODY MASS INDEX (BMI) OF 50.0 TO 59.9 IN ADULT: ICD-10-CM

## 2024-04-17 RX ORDER — TIRZEPATIDE 7.5 MG/.5ML
INJECTION, SOLUTION SUBCUTANEOUS
Qty: 4 PEN | Refills: 1 | Status: SHIPPED | OUTPATIENT
Start: 2024-04-17 | End: 2024-04-18 | Stop reason: SDUPTHER

## 2024-04-18 ENCOUNTER — OFFICE VISIT (OUTPATIENT)
Dept: FAMILY MEDICINE | Facility: CLINIC | Age: 54
End: 2024-04-18
Payer: COMMERCIAL

## 2024-04-18 VITALS — WEIGHT: 293 LBS | HEIGHT: 67 IN | BODY MASS INDEX: 45.99 KG/M2

## 2024-04-18 DIAGNOSIS — R73.03 PREDIABETES: Primary | ICD-10-CM

## 2024-04-18 DIAGNOSIS — E66.01 CLASS 3 SEVERE OBESITY DUE TO EXCESS CALORIES WITH SERIOUS COMORBIDITY AND BODY MASS INDEX (BMI) OF 50.0 TO 59.9 IN ADULT: ICD-10-CM

## 2024-04-18 DIAGNOSIS — R11.0 NAUSEA: ICD-10-CM

## 2024-04-18 DIAGNOSIS — E55.9 VITAMIN D DEFICIENCY: ICD-10-CM

## 2024-04-18 PROCEDURE — 4010F ACE/ARB THERAPY RXD/TAKEN: CPT | Mod: CPTII,95,, | Performed by: FAMILY MEDICINE

## 2024-04-18 PROCEDURE — 99214 OFFICE O/P EST MOD 30 MIN: CPT | Mod: 95,,, | Performed by: FAMILY MEDICINE

## 2024-04-18 PROCEDURE — 1159F MED LIST DOCD IN RCRD: CPT | Mod: CPTII,95,, | Performed by: FAMILY MEDICINE

## 2024-04-18 PROCEDURE — 1160F RVW MEDS BY RX/DR IN RCRD: CPT | Mod: CPTII,95,, | Performed by: FAMILY MEDICINE

## 2024-04-18 PROCEDURE — 3008F BODY MASS INDEX DOCD: CPT | Mod: CPTII,95,, | Performed by: FAMILY MEDICINE

## 2024-04-18 RX ORDER — ONDANSETRON 4 MG/1
4 TABLET, ORALLY DISINTEGRATING ORAL EVERY 8 HOURS PRN
Qty: 30 TABLET | Refills: 0 | Status: SHIPPED | OUTPATIENT
Start: 2024-04-18

## 2024-04-18 RX ORDER — TIRZEPATIDE 7.5 MG/.5ML
7.5 INJECTION, SOLUTION SUBCUTANEOUS
Qty: 4 PEN | Refills: 1 | Status: SHIPPED | OUTPATIENT
Start: 2024-04-18 | End: 2024-04-19 | Stop reason: SDUPTHER

## 2024-04-18 RX ORDER — METHYLPREDNISOLONE 4 MG/1
4 TABLET ORAL DAILY
COMMUNITY
Start: 2024-03-18

## 2024-04-18 NOTE — PROGRESS NOTES
Patient ID: [id number]     Chief Complaint: Obesity        HPI:     This is a telemedicine note. Patient was treated using telemedicine, real time audio and video, according to [facility] protocols. IIliana M.D. , conducted the visit from the [facility]. The patient participated in the visit at a non-[facility] location selected by the patient, identified below. I am licensed in the state where the patient stated they are located. The patient stated that they understood and accepted the privacy and security risks to their information at their location. This visit is not recorded.    Patient was located at the patient's home.     Dolly Romero is a 53 y.o. female here today for a telemedicine visit.    - She is morbidly obese and has pre-diabetes and she refuses weight loss surgery, she is awaiting an appointment with a dietician. She has pre-diabetes, she is taking Zepbound 5 mg weekly, she has lost 17 pounds since last visit, she reports mild nausea, no vomiting, would like Rx Zofran refill for nausea, would like to proceed with increased dose of Zepbound. She denies family history of MEN II or medullary thyroid cancer or personal history of pancreatitis, she is not trying to get pregnant.  - She has a history of Vitamin D deficiency, asymptomatic, due for repeat labs.   - She has RA/drug induced immunodeficiency is controlled with Rx, no side effects, followed by Rheumatology (Dr. Thompson).   - Patient is without any other complaints today.       -------------------------------------    PCOS (polycystic ovarian syndrome)        Past Surgical History:   Procedure Laterality Date    ANGIOGRAM  EXTREMITY BILATERAL Right        Review of patient's allergies indicates:  No Known Allergies    Current Outpatient Medications   Medication Sig Dispense Refill    methylPREDNISolone (MEDROL) 4 MG Tab Take 4 mg by mouth once daily.      albuterol (PROVENTIL/VENTOLIN HFA) 90 mcg/actuation inhaler  Inhale 2 puffs into the lungs every 6 (six) hours as needed.      aspirin 81 mg Cap Take 1 tablet by mouth once daily.      celecoxib (CELEBREX) 200 MG capsule Take 1 capsule (200 mg total) by mouth after lunch. 30 capsule 5    cholecalciferol, vitamin D3, (VITAMIN D3) 50 mcg (2,000 unit) Tab Take 2,000 Units by mouth once daily.      diclofenac (VOLTAREN) 50 MG EC tablet Take 50 mg by mouth 2 (two) times a day.      diclofenac sodium (VOLTAREN) 1 % Gel Apply 4 g topically 2 (two) times a day.      FEROSUL 325 mg (65 mg iron) Tab tablet Take 325 mg by mouth every other day.      fluticasone propionate (FLONASE) 50 mcg/actuation nasal spray 1 spray (50 mcg total) by Each Nostril route 2 (two) times a day. 16 g 11    hydroCHLOROthiazide (HYDRODIURIL) 25 MG tablet Take 1 tablet (25 mg total) by mouth once daily. 90 tablet 3    lisinopriL 10 MG tablet Take 1 tablet (10 mg total) by mouth every morning. 90 tablet 3    magnesium 30 mg Tab Take 30 mg by mouth 2 (two) times a day.      omeprazole (PRILOSEC) 40 MG capsule Take 1 capsule (40 mg total) by mouth once daily. Before lunch 30 capsule 11    ondansetron (ZOFRAN-ODT) 4 MG TbDL Take 1 tablet (4 mg total) by mouth every 8 (eight) hours as needed (nausea). 30 tablet 0    spironolactone (ALDACTONE) 50 MG tablet Take 50 mg by mouth 2 (two) times daily.      tirzepatide, weight loss, (ZEPBOUND) 7.5 mg/0.5 mL PnIj Inject 7.5 mg into the skin every 7 days. 4 Pen 1     No current facility-administered medications for this visit.       Social History     Socioeconomic History    Marital status: Single   Tobacco Use    Smoking status: Never    Smokeless tobacco: Never   Substance and Sexual Activity    Alcohol use: Not Currently    Drug use: Never    Sexual activity: Yes     Partners: Male     Social Determinants of Health     Financial Resource Strain: Medium Risk (1/31/2024)    Overall Financial Resource Strain (CARDIA)     Difficulty of Paying Living Expenses: Somewhat hard    Food Insecurity: No Food Insecurity (1/31/2024)    Hunger Vital Sign     Worried About Running Out of Food in the Last Year: Never true     Ran Out of Food in the Last Year: Never true   Transportation Needs: No Transportation Needs (1/31/2024)    PRAPARE - Transportation     Lack of Transportation (Medical): No     Lack of Transportation (Non-Medical): No   Physical Activity: Insufficiently Active (1/31/2024)    Exercise Vital Sign     Days of Exercise per Week: 4 days     Minutes of Exercise per Session: 30 min   Stress: Stress Concern Present (1/31/2024)    [employer] [place] [employer] - Occupational Stress Questionnaire     Feeling of Stress : To some extent   Social Connections: Socially Isolated (1/31/2024)    Social Connection and Isolation Panel [NHANES]     Frequency of Communication with Friends and Family: Twice a week     Frequency of Social Gatherings with Friends and Family: Never     Attends [employer] Services: More than 4 times per year     Active Member of Clubs or Organizations: No     Attends Club or Organization Meetings: Never     Marital Status: Never    Housing Stability: Low Risk  (1/31/2024)    Housing Stability Vital Sign     Unable to Pay for Housing in the Last Year: No     Number of Places Lived in the Last Year: 1     Unstable Housing in the Last Year: No        Family History   Problem Relation Name Age of Onset    Hypertension Mother      Diabetes Mother      Arthritis Mother      Pancreatic cancer Mother      Heart disease Father      Prostate cancer Father          Subjective:       See HPI for details    Constitutional: Denies Change in appetite. Denies Chills. Denies Fever. Denies Night sweats.  Eye: Denies Blurred vision. Denies Discharge. Denies Eye pain.  ENT: Denies Decreased hearing. Denies Sore throat. Denies Swollen glands.  Respiratory: Denies Cough. Denies Shortness of breath. Denies Shortness of breath with exertion. Denies Wheezing.  Cardiovascular:  "Denies Chest pain at rest. Denies Chest pain with exertion. Denies Irregular heartbeat. Denies Palpitations.  Gastrointestinal: Denies Abdominal pain. Denies Diarrhea. Denies Nausea. Denies Vomiting. Denies Hematemesis or Hematochezia.  Genitourinary: Denies Dysuria. Denies Urinary frequency. Denies Urinary urgency. Denies Blood in urine.  Endocrine: Denies Cold intolerance. Denies Excessive thirst. Denies Heat intolerance. Denies Weight loss. Denies Weight gain.  Musculoskeletal: Denies Painful joints. Denies Weakness.  Integumentary: Denies Rash. Denies Itching. Denies Dry skin.  Neurologic: Denies Dizziness. Denies Fainting. Denies Headache.  Psychiatric: Denies Depression. Denies Anxiety. Denies Suicidal/Homicidal ideations.    All Other ROS: Negative except as stated in HPI.   Answers submitted by the patient for this visit:  Review of Systems Questionnaire (Submitted on 4/18/2024)  activity change: No  unexpected weight change: No  neck pain: No  hearing loss: No  rhinorrhea: No  trouble swallowing: No  eye discharge: No  visual disturbance: No  chest tightness: No  wheezing: No  chest pain: No  palpitations: No  blood in stool: No  constipation: No  vomiting: No  diarrhea: No  polydipsia: No  polyuria: No  difficulty urinating: No  hematuria: No  menstrual problem: No  dysuria: No  joint swelling: No  arthralgias: No  headaches: No  weakness: No  confusion: No  dysphoric mood: No      Objective:     Ht 5' 7" (1.702 m)   Wt (!) 145.2 kg (320 lb)   BMI 50.12 kg/m²     Physical Exam    Physical Exam: LIMITED DUE TO TELEMEDICINE RESTRICTIONS.  General: Alert and oriented, No acute distress. Obese.  Head: Normocephalic, Atraumatic.  Eye: Sclera non-icteric.  Neck/Thyroid:  Full range of motion.  Respiratory: Non-labored respirations, Symmetrical chest wall expansion.  Musculoskeletal: Normal range of motion.  Integumentary: Warm, Dry, Intact, No visible suspicious lesions or rashes. No diaphoresis.   Neurologic: " No focal deficits  Psychiatric: Normal interaction, Coherent speech, Euthymic mood, Appropriate affect       Assessment:       ICD-10-CM ICD-9-CM   1. Prediabetes  R73.03 790.29   2. Class 3 severe obesity due to excess calories with serious comorbidity and body mass index (BMI) of 50.0 to 59.9 in adult  E66.01 278.01    Z68.43 V85.43   3. Nausea  R11.0 787.02   4. Vitamin D deficiency  E55.9 268.9        Plan:     Problem List Items Addressed This Visit          Endocrine    Class 3 severe obesity due to excess calories with serious comorbidity and body mass index (BMI) of 50.0 to 59.9 in adult    Relevant Medications    tirzepatide, weight loss, (ZEPBOUND) 7.5 mg/0.5 mL PnIj    Other Relevant Orders    Comprehensive Metabolic Panel    Hemoglobin A1C     Other Visit Diagnoses       Prediabetes    -  Primary    Relevant Medications    tirzepatide, weight loss, (ZEPBOUND) 7.5 mg/0.5 mL PnIj    Other Relevant Orders    Comprehensive Metabolic Panel    Hemoglobin A1C    Nausea        Relevant Medications    ondansetron (ZOFRAN-ODT) 4 MG TbDL    Vitamin D deficiency        Relevant Orders    Vitamin D         1. Prediabetes  - tirzepatide, weight loss, (ZEPBOUND) 7.5 mg/0.5 mL PnIj; Inject 7.5 mg into the skin every 7 days.  Dispense: 4 Pen; Refill: 1  - Comprehensive Metabolic Panel; Future  - Hemoglobin A1C; Future  - Diet, exercise, and 10% weight loss encouraged. Rx trial of increased dose of Zepbound 7.5mg weekly with close monitoring to help with pre-diabetes and obesity. She agrees to avoid pregnancy with Zepbound. Will titrate Rx Zepbound as needed/tolerated until max dose achieved. Recheck CMP, HgA1C next available. Notify M.D. or ER if symptoms persist or worsen, adverse Rx side effects, pancreatitis, temp greater than 100.4, or any acute illness.    Lab Results   Component Value Date    HGBA1C 5.7 06/02/2023      Continue   Follow ADA Diet. Avoid soda, simple sweets, and limit  rice/pasta/breads/starches.  Maintain healthy weight with goal BMI <30.  Exercise 5 times per week for 30 minutes per day.  Body mass index is 50.12 kg/m².  Goal BMI <30.  Exercise 5 times a week for 30 minutes per day.  Avoid soda, simple sugars, excessive rice, potatoes or bread. Limit fast foods and fried foods.  Choose complex carbs in moderation (example: green vegetables, beans, oatmeal). Eat plenty of fresh fruits and vegetables with lean meats daily.  Do not skip meals. Eat a balanced portion size.  Avoid fad diets. Consider permanent healthy life style changes.      2. Class 3 severe obesity due to excess calories with serious comorbidity and body mass index (BMI) of 50.0 to 59.9 in adult  - tirzepatide, weight loss, (ZEPBOUND) 7.5 mg/0.5 mL PnIj; Inject 7.5 mg into the skin every 7 days.  Dispense: 4 Pen; Refill: 1  - Comprehensive Metabolic Panel; Future  - Hemoglobin A1C; Future  - Same as #1.     3. Nausea  - Rx ondansetron (ZOFRAN-ODT) 4 MG TbDL; Take 1 tablet (4 mg total) by mouth every 8 (eight) hours as needed (nausea).  Dispense: 30 tablet; Refill: 0 refilled today    4. Vitamin D deficiency  - Vitamin D; Future        Dolly was seen today for obesity.    Diagnoses and all orders for this visit:    Prediabetes  -     tirzepatide, weight loss, (ZEPBOUND) 7.5 mg/0.5 mL PnIj; Inject 7.5 mg into the skin every 7 days.  -     Comprehensive Metabolic Panel; Future  -     Hemoglobin A1C; Future    Class 3 severe obesity due to excess calories with serious comorbidity and body mass index (BMI) of 50.0 to 59.9 in adult  -     tirzepatide, weight loss, (ZEPBOUND) 7.5 mg/0.5 mL PnIj; Inject 7.5 mg into the skin every 7 days.  -     Comprehensive Metabolic Panel; Future  -     Hemoglobin A1C; Future    Nausea  -     ondansetron (ZOFRAN-ODT) 4 MG TbDL; Take 1 tablet (4 mg total) by mouth every 8 (eight) hours as needed (nausea).    Vitamin D deficiency  -     Vitamin D; Future          Medication List with  Changes/Refills   Current Medications    ALBUTEROL (PROVENTIL/VENTOLIN HFA) 90 MCG/ACTUATION INHALER    Inhale 2 puffs into the lungs every 6 (six) hours as needed.       Start Date: 4/3/2023  End Date: --    ASPIRIN 81 MG CAP    Take 1 tablet by mouth once daily.       Start Date: --        End Date: --    CELECOXIB (CELEBREX) 200 MG CAPSULE    Take 1 capsule (200 mg total) by mouth after lunch.       Start Date: 9/18/2023 End Date: --    CHOLECALCIFEROL, VITAMIN D3, (VITAMIN D3) 50 MCG (2,000 UNIT) TAB    Take 2,000 Units by mouth once daily.       Start Date: --        End Date: --    DICLOFENAC (VOLTAREN) 50 MG EC TABLET    Take 50 mg by mouth 2 (two) times a day.       Start Date: 5/29/2023 End Date: --    DICLOFENAC SODIUM (VOLTAREN) 1 % GEL    Apply 4 g topically 2 (two) times a day.       Start Date: 5/30/2023 End Date: --    FEROSUL 325 MG (65 MG IRON) TAB TABLET    Take 325 mg by mouth every other day.       Start Date: 4/21/2023 End Date: --    FLUTICASONE PROPIONATE (FLONASE) 50 MCG/ACTUATION NASAL SPRAY    1 spray (50 mcg total) by Each Nostril route 2 (two) times a day.       Start Date: 5/8/2023  End Date: --    HYDROCHLOROTHIAZIDE (HYDRODIURIL) 25 MG TABLET    Take 1 tablet (25 mg total) by mouth once daily.       Start Date: 6/19/2023 End Date: --    LISINOPRIL 10 MG TABLET    Take 1 tablet (10 mg total) by mouth every morning.       Start Date: 6/19/2023 End Date: --    MAGNESIUM 30 MG TAB    Take 30 mg by mouth 2 (two) times a day.       Start Date: --        End Date: --    METHYLPREDNISOLONE (MEDROL) 4 MG TAB    Take 4 mg by mouth once daily.       Start Date: 3/18/2024 End Date: --    OMEPRAZOLE (PRILOSEC) 40 MG CAPSULE    Take 1 capsule (40 mg total) by mouth once daily. Before lunch       Start Date: 9/18/2023 End Date: 9/17/2024    SPIRONOLACTONE (ALDACTONE) 50 MG TABLET    Take 50 mg by mouth 2 (two) times daily.       Start Date: 5/4/2023  End Date: --   Changed and/or Refilled Medications     Modified Medication Previous Medication    ONDANSETRON (ZOFRAN-ODT) 4 MG TBDL ondansetron (ZOFRAN-ODT) 4 MG TbDL       Take 1 tablet (4 mg total) by mouth every 8 (eight) hours as needed (nausea).    Take 1 tablet (4 mg total) by mouth every 8 (eight) hours as needed (nausea).       Start Date: 4/18/2024 End Date: --    Start Date: 2/1/2024  End Date: 4/18/2024    TIRZEPATIDE, WEIGHT LOSS, (ZEPBOUND) 7.5 MG/0.5 ML PNIJ tirzepatide, weight loss, (ZEPBOUND) 7.5 mg/0.5 mL PnIj       Inject 7.5 mg into the skin every 7 days.    INJECT 7.5 MG  SUBCUTANEOUSLY ONCE A WEEK (EVERY 7 DAYS)       Start Date: 4/18/2024 End Date: 6/17/2024    Start Date: 4/17/2024 End Date: 4/18/2024   Discontinued Medications    AMOXICILLIN-CLAVULANATE 875-125MG (AUGMENTIN) 875-125 MG PER TABLET    Take 1 tablet by mouth every 12 (twelve) hours. for 10 days       Start Date: 3/28/2024 End Date: 4/18/2024    HYDROCODONE-ACETAMINOPHEN (NORCO) 5-325 MG PER TABLET    Take 1 tablet by mouth every 8 (eight) hours as needed for Pain.       Start Date: 3/28/2024 End Date: 4/18/2024    HYDROXYCHLOROQUINE (PLAQUENIL) 200 MG TABLET    Take 1 tablet (200 mg total) by mouth 2 (two) times daily. After food       Start Date: 9/18/2023 End Date: 4/18/2024    METFORMIN (GLUCOPHAGE) 500 MG TABLET    Take 500 mg by mouth once daily.       Start Date: 8/29/2023 End Date: 4/18/2024    TIRZEPATIDE, WEIGHT LOSS, (ZEPBOUND) 5 MG/0.5 ML PNIJ    INJECT 5MG SUBCUTANEOUSLY  ONCE A WEEK       Start Date: 3/25/2024 End Date: 4/18/2024          Follow up in about 4 weeks (around 5/16/2024) for Prediabetes Followup, Obesity Followup, Virtual Visit.      Audio/Video Time Documentation:  Spent 13 minutes for telemedicine visit with successful audio/visual connection. [facility] was used for billing.

## 2024-04-19 ENCOUNTER — PATIENT MESSAGE (OUTPATIENT)
Dept: FAMILY MEDICINE | Facility: CLINIC | Age: 54
End: 2024-04-19
Payer: COMMERCIAL

## 2024-04-19 DIAGNOSIS — R73.03 PREDIABETES: ICD-10-CM

## 2024-04-19 DIAGNOSIS — E66.01 CLASS 3 SEVERE OBESITY DUE TO EXCESS CALORIES WITH SERIOUS COMORBIDITY AND BODY MASS INDEX (BMI) OF 50.0 TO 59.9 IN ADULT: ICD-10-CM

## 2024-04-19 RX ORDER — TIRZEPATIDE 7.5 MG/.5ML
7.5 INJECTION, SOLUTION SUBCUTANEOUS
Qty: 4 PEN | Refills: 1 | Status: SHIPPED | OUTPATIENT
Start: 2024-04-19 | End: 2024-05-17

## 2024-05-15 ENCOUNTER — PATIENT MESSAGE (OUTPATIENT)
Dept: FAMILY MEDICINE | Facility: CLINIC | Age: 54
End: 2024-05-15
Payer: COMMERCIAL

## 2024-05-16 DIAGNOSIS — E66.01 CLASS 3 SEVERE OBESITY DUE TO EXCESS CALORIES WITH SERIOUS COMORBIDITY AND BODY MASS INDEX (BMI) OF 50.0 TO 59.9 IN ADULT: ICD-10-CM

## 2024-05-16 DIAGNOSIS — E55.9 VITAMIN D DEFICIENCY: ICD-10-CM

## 2024-05-16 DIAGNOSIS — Z00.00 WELLNESS EXAMINATION: Primary | ICD-10-CM

## 2024-05-16 DIAGNOSIS — R73.03 PREDIABETES: ICD-10-CM

## 2024-05-16 RX ORDER — TIRZEPATIDE 5 MG/.5ML
5 INJECTION, SOLUTION SUBCUTANEOUS
Qty: 4 PEN | Refills: 2 | Status: SHIPPED | OUTPATIENT
Start: 2024-05-16 | End: 2024-05-21

## 2024-05-17 ENCOUNTER — LAB VISIT (OUTPATIENT)
Dept: LAB | Facility: HOSPITAL | Age: 54
End: 2024-05-17
Attending: FAMILY MEDICINE
Payer: COMMERCIAL

## 2024-05-17 DIAGNOSIS — E66.01 CLASS 3 SEVERE OBESITY DUE TO EXCESS CALORIES WITH SERIOUS COMORBIDITY AND BODY MASS INDEX (BMI) OF 50.0 TO 59.9 IN ADULT: ICD-10-CM

## 2024-05-17 DIAGNOSIS — Z00.00 WELLNESS EXAMINATION: ICD-10-CM

## 2024-05-17 DIAGNOSIS — R73.03 PREDIABETES: ICD-10-CM

## 2024-05-17 DIAGNOSIS — E55.9 VITAMIN D DEFICIENCY: ICD-10-CM

## 2024-05-17 LAB
25(OH)D3+25(OH)D2 SERPL-MCNC: 47 NG/ML (ref 30–80)
ALBUMIN SERPL-MCNC: 3.7 G/DL (ref 3.5–5)
ALBUMIN/GLOB SERPL: 1.1 RATIO (ref 1.1–2)
ALP SERPL-CCNC: 91 UNIT/L (ref 40–150)
ALT SERPL-CCNC: 17 UNIT/L (ref 0–55)
AST SERPL-CCNC: 17 UNIT/L (ref 5–34)
BACTERIA #/AREA URNS AUTO: ABNORMAL /HPF
BASOPHILS # BLD AUTO: 0.03 X10(3)/MCL
BASOPHILS NFR BLD AUTO: 0.5 %
BILIRUB SERPL-MCNC: 0.5 MG/DL
BILIRUB UR QL STRIP.AUTO: NEGATIVE
BUN SERPL-MCNC: 13.5 MG/DL (ref 9.8–20.1)
CALCIUM SERPL-MCNC: 9.1 MG/DL (ref 8.4–10.2)
CHLORIDE SERPL-SCNC: 104 MMOL/L (ref 98–107)
CHOLEST SERPL-MCNC: 179 MG/DL
CHOLEST/HDLC SERPL: 4 {RATIO} (ref 0–5)
CLARITY UR: CLEAR
CO2 SERPL-SCNC: 21 MMOL/L (ref 22–29)
COLOR UR AUTO: YELLOW
CREAT SERPL-MCNC: 1.01 MG/DL (ref 0.55–1.02)
EOSINOPHIL # BLD AUTO: 0.05 X10(3)/MCL (ref 0–0.9)
EOSINOPHIL NFR BLD AUTO: 0.8 %
ERYTHROCYTE [DISTWIDTH] IN BLOOD BY AUTOMATED COUNT: 15.4 % (ref 11.5–17)
EST. AVERAGE GLUCOSE BLD GHB EST-MCNC: 108.3 MG/DL
GFR SERPLBLD CREATININE-BSD FMLA CKD-EPI: >60 ML/MIN/1.73/M2
GLOBULIN SER-MCNC: 3.4 GM/DL (ref 2.4–3.5)
GLUCOSE SERPL-MCNC: 90 MG/DL (ref 74–100)
GLUCOSE UR QL STRIP: NORMAL
HBA1C MFR BLD: 5.4 %
HCT VFR BLD AUTO: 42.5 % (ref 37–47)
HDLC SERPL-MCNC: 40 MG/DL (ref 35–60)
HGB BLD-MCNC: 13.8 G/DL (ref 12–16)
HGB UR QL STRIP: NEGATIVE
HYALINE CASTS #/AREA URNS LPF: ABNORMAL /LPF
IMM GRANULOCYTES # BLD AUTO: 0.03 X10(3)/MCL (ref 0–0.04)
IMM GRANULOCYTES NFR BLD AUTO: 0.5 %
KETONES UR QL STRIP: ABNORMAL
LDLC SERPL CALC-MCNC: 124 MG/DL (ref 50–140)
LEUKOCYTE ESTERASE UR QL STRIP: NEGATIVE
LYMPHOCYTES # BLD AUTO: 2.08 X10(3)/MCL (ref 0.6–4.6)
LYMPHOCYTES NFR BLD AUTO: 35.1 %
MCH RBC QN AUTO: 25.6 PG (ref 27–31)
MCHC RBC AUTO-ENTMCNC: 32.5 G/DL (ref 33–36)
MCV RBC AUTO: 78.7 FL (ref 80–94)
MONOCYTES # BLD AUTO: 0.46 X10(3)/MCL (ref 0.1–1.3)
MONOCYTES NFR BLD AUTO: 7.8 %
MUCOUS THREADS URNS QL MICRO: ABNORMAL /LPF
NEUTROPHILS # BLD AUTO: 3.28 X10(3)/MCL (ref 2.1–9.2)
NEUTROPHILS NFR BLD AUTO: 55.3 %
NITRITE UR QL STRIP: NEGATIVE
NRBC BLD AUTO-RTO: 0 %
PH UR STRIP: 6 [PH]
PLATELET # BLD AUTO: 315 X10(3)/MCL (ref 130–400)
PMV BLD AUTO: 11.2 FL (ref 7.4–10.4)
POTASSIUM SERPL-SCNC: 4 MMOL/L (ref 3.5–5.1)
PROT SERPL-MCNC: 7.1 GM/DL (ref 6.4–8.3)
PROT UR QL STRIP: NEGATIVE
RBC # BLD AUTO: 5.4 X10(6)/MCL (ref 4.2–5.4)
RBC #/AREA URNS AUTO: ABNORMAL /HPF
SODIUM SERPL-SCNC: 137 MMOL/L (ref 136–145)
SP GR UR STRIP.AUTO: 1.02 (ref 1–1.03)
SQUAMOUS #/AREA URNS LPF: ABNORMAL /HPF
T4 FREE SERPL-MCNC: 1.1 NG/DL (ref 0.7–1.48)
TRIGL SERPL-MCNC: 77 MG/DL (ref 37–140)
TSH SERPL-ACNC: 2 UIU/ML (ref 0.35–4.94)
UROBILINOGEN UR STRIP-ACNC: NORMAL
VLDLC SERPL CALC-MCNC: 15 MG/DL
WBC # SPEC AUTO: 5.93 X10(3)/MCL (ref 4.5–11.5)
WBC #/AREA URNS AUTO: ABNORMAL /HPF

## 2024-05-17 PROCEDURE — 80053 COMPREHEN METABOLIC PANEL: CPT

## 2024-05-17 PROCEDURE — 83036 HEMOGLOBIN GLYCOSYLATED A1C: CPT

## 2024-05-17 PROCEDURE — 80061 LIPID PANEL: CPT

## 2024-05-17 PROCEDURE — 84439 ASSAY OF FREE THYROXINE: CPT

## 2024-05-17 PROCEDURE — 82306 VITAMIN D 25 HYDROXY: CPT

## 2024-05-17 PROCEDURE — 85025 COMPLETE CBC W/AUTO DIFF WBC: CPT

## 2024-05-17 PROCEDURE — 81015 MICROSCOPIC EXAM OF URINE: CPT

## 2024-05-17 PROCEDURE — 84432 ASSAY OF THYROGLOBULIN: CPT

## 2024-05-17 PROCEDURE — 86376 MICROSOMAL ANTIBODY EACH: CPT

## 2024-05-17 PROCEDURE — 36415 COLL VENOUS BLD VENIPUNCTURE: CPT

## 2024-05-17 PROCEDURE — 84443 ASSAY THYROID STIM HORMONE: CPT

## 2024-05-17 RX ORDER — TIRZEPATIDE 7.5 MG/.5ML
INJECTION, SOLUTION SUBCUTANEOUS
Qty: 4 ML | Refills: 0 | Status: SHIPPED | OUTPATIENT
Start: 2024-05-17 | End: 2024-05-20 | Stop reason: SDUPTHER

## 2024-05-20 DIAGNOSIS — R73.03 PREDIABETES: ICD-10-CM

## 2024-05-20 DIAGNOSIS — E66.01 CLASS 3 SEVERE OBESITY DUE TO EXCESS CALORIES WITH SERIOUS COMORBIDITY AND BODY MASS INDEX (BMI) OF 50.0 TO 59.9 IN ADULT: ICD-10-CM

## 2024-05-20 RX ORDER — TIRZEPATIDE 7.5 MG/.5ML
7.5 INJECTION, SOLUTION SUBCUTANEOUS WEEKLY
Qty: 4 ML | Refills: 0 | Status: SHIPPED | OUTPATIENT
Start: 2024-05-20 | End: 2024-05-21

## 2024-05-20 NOTE — TELEPHONE ENCOUNTER
----- Message from Trevon Saucedo sent at 5/20/2024 10:42 AM CDT -----  .Type:  RX Refill Request    Who Called: [facility]   Refill or New Rx: Refill   RX Name and Strength: ZEPBOUND 7.5 mg/0.5 mL PnIj  How is the patient currently taking it? (ex. 1XDay):  Is this a 30 day or 90 day RX:  Preferred Pharmacy with phone number:  Local or Mail Order: Mail Order   Ordering Provider: Liyah  Would the patient rather a call back or a response via [facility]?   Best Call Back Number: [phone]  Additional Information: Please call back with any questions.

## 2024-05-21 ENCOUNTER — PATIENT MESSAGE (OUTPATIENT)
Dept: FAMILY MEDICINE | Facility: CLINIC | Age: 54
End: 2024-05-21

## 2024-05-21 ENCOUNTER — OFFICE VISIT (OUTPATIENT)
Dept: FAMILY MEDICINE | Facility: CLINIC | Age: 54
End: 2024-05-21
Payer: COMMERCIAL

## 2024-05-21 VITALS — HEIGHT: 67 IN | BODY MASS INDEX: 45.99 KG/M2 | WEIGHT: 293 LBS

## 2024-05-21 DIAGNOSIS — E66.01 CLASS 3 SEVERE OBESITY DUE TO EXCESS CALORIES WITH SERIOUS COMORBIDITY AND BODY MASS INDEX (BMI) OF 45.0 TO 49.9 IN ADULT: Primary | ICD-10-CM

## 2024-05-21 PROCEDURE — 1160F RVW MEDS BY RX/DR IN RCRD: CPT | Mod: CPTII,95,, | Performed by: FAMILY MEDICINE

## 2024-05-21 PROCEDURE — 3008F BODY MASS INDEX DOCD: CPT | Mod: CPTII,95,, | Performed by: FAMILY MEDICINE

## 2024-05-21 PROCEDURE — 1159F MED LIST DOCD IN RCRD: CPT | Mod: CPTII,95,, | Performed by: FAMILY MEDICINE

## 2024-05-21 PROCEDURE — 99213 OFFICE O/P EST LOW 20 MIN: CPT | Mod: 95,,, | Performed by: FAMILY MEDICINE

## 2024-05-21 PROCEDURE — 3044F HG A1C LEVEL LT 7.0%: CPT | Mod: CPTII,95,, | Performed by: FAMILY MEDICINE

## 2024-05-21 PROCEDURE — 4010F ACE/ARB THERAPY RXD/TAKEN: CPT | Mod: CPTII,95,, | Performed by: FAMILY MEDICINE

## 2024-05-21 RX ORDER — TIRZEPATIDE 10 MG/.5ML
10 INJECTION, SOLUTION SUBCUTANEOUS
Qty: 2 ML | Refills: 1 | Status: SHIPPED | OUTPATIENT
Start: 2024-05-21 | End: 2024-06-19 | Stop reason: SDUPTHER

## 2024-05-21 NOTE — PROGRESS NOTES
Patient ID: [id number]     Chief Complaint: Obesity        HPI:     This is a telemedicine note. Patient was treated using telemedicine, real time audio and video, according to [facility] protocols. IIliana M.D. , conducted the visit from the [facility]. The patient participated in the visit at a non-[facility] location selected by the patient, identified below. I am licensed in the state where the patient stated they are located. The patient stated that they understood and accepted the privacy and security risks to their information at their location. This visit is not recorded.    Patient was located at the patient's home.     Dolly Romero is a 53 y.o. female here today for a telemedicine visit.    - She is morbidly obese and has pre-diabetes and she refuses weight loss surgery, she is awaiting an appointment with a dietician. She has pre-diabetes, she is taking Zepbound 7.5 mg weekly, she has lost 8 pounds since last visit, she reports mild nausea, no vomiting, does well with Zofran, no side effects, she has Rx at home, would like to proceed with increased dose of Zepbound. She denies family history of MEN II or medullary thyroid cancer or personal history of pancreatitis, she is not trying to get pregnant. She had labs done on 05/17/2024, here to discuss the results today.  - Patient is without any other complaints today.         -------------------------------------    PCOS (polycystic ovarian syndrome)        Past Surgical History:   Procedure Laterality Date    ANGIOGRAM  EXTREMITY BILATERAL Right        Review of patient's allergies indicates:  No Known Allergies    No outpatient medications have been marked as taking for the 5/21/24 encounter (Office Visit) with Iliana Pelletier MD.       Social History     Socioeconomic History    Marital status: Single   Tobacco Use    Smoking status: Never    Smokeless tobacco: Never   Substance and Sexual Activity    Alcohol use: Not  Currently    Drug use: Never    Sexual activity: Yes     Partners: Male     Social Determinants of Health     Financial Resource Strain: Medium Risk (1/31/2024)    Overall Financial Resource Strain (CARDIA)     Difficulty of Paying Living Expenses: Somewhat hard   Food Insecurity: No Food Insecurity (1/31/2024)    Hunger Vital Sign     Worried About Running Out of Food in the Last Year: Never true     Ran Out of Food in the Last Year: Never true   Transportation Needs: No Transportation Needs (1/31/2024)    PRAPARE - Transportation     Lack of Transportation (Medical): No     Lack of Transportation (Non-Medical): No   Physical Activity: Insufficiently Active (1/31/2024)    Exercise Vital Sign     Days of Exercise per Week: 4 days     Minutes of Exercise per Session: 30 min   Stress: Stress Concern Present (1/31/2024)    [employer] - Occupational Stress Questionnaire     Feeling of Stress : To some extent   Housing Stability: Low Risk  (1/31/2024)    Housing Stability Vital Sign     Unable to Pay for Housing in the Last Year: No     Number of Places Lived in the Last Year: 1     Unstable Housing in the Last Year: No        Family History   Problem Relation Name Age of Onset    Hypertension Mother      Diabetes Mother      Arthritis Mother      Pancreatic cancer Mother      Heart disease Father      Prostate cancer Father          Subjective:       See HPI for details    Constitutional: Denies Change in appetite. Denies Chills. Denies Fever. Denies Night sweats.  Eye: Denies Blurred vision. Denies Discharge. Denies Eye pain.  ENT: Denies Decreased hearing. Denies Sore throat. Denies Swollen glands.  Respiratory: Denies Cough. Denies Shortness of breath. Denies Shortness of breath with exertion. Denies Wheezing.  Cardiovascular: Denies Chest pain at rest. Denies Chest pain with exertion. Denies Irregular heartbeat. Denies Palpitations.  Gastrointestinal: Denies Abdominal pain. Denies  "Diarrhea. Denies Nausea. Denies Vomiting. Denies Hematemesis or Hematochezia.  Genitourinary: Denies Dysuria. Denies Urinary frequency. Denies Urinary urgency. Denies Blood in urine.  Endocrine: Denies Cold intolerance. Denies Excessive thirst. Denies Heat intolerance. Denies Weight loss. Denies Weight gain.  Musculoskeletal: Denies Painful joints. Denies Weakness.  Integumentary: Denies Rash. Denies Itching. Denies Dry skin.  Neurologic: Denies Dizziness. Denies Fainting. Denies Headache.  Psychiatric: Denies Depression. Denies Anxiety. Denies Suicidal/Homicidal ideations.    All Other ROS: Negative except as stated in HPI.   Answers submitted by the patient for this visit:  Review of Systems Questionnaire (Submitted on 5/21/2024)  activity change: No  unexpected weight change: No  neck pain: No  hearing loss: No  rhinorrhea: No  trouble swallowing: No  eye discharge: No  visual disturbance: No  chest tightness: No  wheezing: No  chest pain: No  palpitations: No  blood in stool: No  constipation: No  vomiting: No  diarrhea: No  polydipsia: No  polyuria: No  difficulty urinating: No  hematuria: No  menstrual problem: No  dysuria: No  joint swelling: No  arthralgias: No  headaches: No  weakness: No  confusion: No  dysphoric mood: No      Objective:     Ht 5' 7" (1.702 m)   Wt (!) 141.9 kg (312 lb 12.8 oz)   BMI 48.99 kg/m²     Physical Exam    Physical Exam: LIMITED DUE TO TELEMEDICINE RESTRICTIONS.  General: Alert and oriented, No acute distress. Obese.   Head: Normocephalic, Atraumatic.  Eye: Sclera non-icteric.  Neck/Thyroid:  Full range of motion.  Respiratory: Non-labored respirations, Symmetrical chest wall expansion.  Musculoskeletal: Normal range of motion.  Integumentary: Warm, Dry, Intact, No visible suspicious lesions or rashes. No diaphoresis.   Neurologic: No focal deficits  Psychiatric: Normal interaction, Coherent speech, Euthymic mood, Appropriate affect     *Lab results from 05/17/2024 were " reviewed and discussed with patient and patient voices understanding.*      The 10-year ASCVD risk score (Jose Eduardo BRASWELL, et al., 2019) is: 3.3%    Values used to calculate the score:      Age: 53 years      Sex: Female      Is Non- : Yes      Diabetic: No      Tobacco smoker: No      Systolic Blood Pressure: 115 mmHg      Is BP treated: Yes      HDL Cholesterol: 40 mg/dL      Total Cholesterol: 179 mg/dL     Assessment:       ICD-10-CM ICD-9-CM   1. Class 3 severe obesity due to excess calories with serious comorbidity and body mass index (BMI) of 45.0 to 49.9 in adult  E66.01 278.01    Z68.42 V85.42        Plan:     Problem List Items Addressed This Visit    None  Visit Diagnoses       Class 3 severe obesity due to excess calories with serious comorbidity and body mass index (BMI) of 45.0 to 49.9 in adult    -  Primary    Relevant Medications    tirzepatide, weight loss, (ZEPBOUND) 10 mg/0.5 mL PnIj         1. Class 3 severe obesity due to excess calories with serious comorbidity and body mass index (BMI) of 45.0 to 49.9 in adult  - tirzepatide, weight loss, (ZEPBOUND) 10 mg/0.5 mL PnIj; Inject 10 mg into the skin every 7 days.  Dispense: 2 mL; Refill: 1  - Diet, exercise, and 10% weight loss encouraged. Rx trial of increased dose of Zepbound 10 mg weekly with close monitoring to help with pre-diabetes and obesity. She agrees to avoid pregnancy with Zepbound. Will titrate Rx Zepbound as needed/tolerated until max dose achieved. Recheck CMP, HgA1C with annual wellness labs. Notify M.D. or ER if symptoms persist or worsen, adverse Rx side effects, pancreatitis, temp greater than 100.4, or any acute illness.    Body mass index is 48.99 kg/m².  Goal BMI <30.  Exercise 5 times a week for 30 minutes per day.  Avoid soda, simple sugars, excessive rice, potatoes or bread. Limit fast foods and fried foods.  Choose complex carbs in moderation (example: green vegetables, beans, oatmeal). Eat plenty of  fresh fruits and vegetables with lean meats daily.  Do not skip meals. Eat a balanced portion size.  Avoid fad diets. Consider permanent healthy life style changes.        Dolly was seen today for obesity.    Diagnoses and all orders for this visit:    Class 3 severe obesity due to excess calories with serious comorbidity and body mass index (BMI) of 45.0 to 49.9 in adult  -     tirzepatide, weight loss, (ZEPBOUND) 10 mg/0.5 mL PnIj; Inject 10 mg into the skin every 7 days.          Medication List with Changes/Refills   New Medications    TIRZEPATIDE, WEIGHT LOSS, (ZEPBOUND) 10 MG/0.5 ML PNIJ    Inject 10 mg into the skin every 7 days.       Start Date: 5/21/2024 End Date: 7/20/2024   Current Medications    ALBUTEROL (PROVENTIL/VENTOLIN HFA) 90 MCG/ACTUATION INHALER    Inhale 2 puffs into the lungs every 6 (six) hours as needed.       Start Date: 4/3/2023  End Date: --    ASPIRIN 81 MG CAP    Take 1 tablet by mouth once daily.       Start Date: --        End Date: --    CELECOXIB (CELEBREX) 200 MG CAPSULE    Take 1 capsule (200 mg total) by mouth after lunch.       Start Date: 9/18/2023 End Date: --    CHOLECALCIFEROL, VITAMIN D3, (VITAMIN D3) 50 MCG (2,000 UNIT) TAB    Take 2,000 Units by mouth once daily.       Start Date: --        End Date: --    DICLOFENAC (VOLTAREN) 50 MG EC TABLET    Take 50 mg by mouth 2 (two) times a day.       Start Date: 5/29/2023 End Date: --    DICLOFENAC SODIUM (VOLTAREN) 1 % GEL    Apply 4 g topically 2 (two) times a day.       Start Date: 5/30/2023 End Date: --    FEROSUL 325 MG (65 MG IRON) TAB TABLET    Take 325 mg by mouth every other day.       Start Date: 4/21/2023 End Date: --    FLUTICASONE PROPIONATE (FLONASE) 50 MCG/ACTUATION NASAL SPRAY    1 spray (50 mcg total) by Each Nostril route 2 (two) times a day.       Start Date: 5/8/2023  End Date: --    LISINOPRIL 10 MG TABLET    Take 1 tablet (10 mg total) by mouth every morning.       Start Date: 6/19/2023 End Date: --     MAGNESIUM 30 MG TAB    Take 30 mg by mouth 2 (two) times a day.       Start Date: --        End Date: --    METHYLPREDNISOLONE (MEDROL) 4 MG TAB    Take 4 mg by mouth once daily.       Start Date: 3/18/2024 End Date: --    OMEPRAZOLE (PRILOSEC) 40 MG CAPSULE    Take 1 capsule (40 mg total) by mouth once daily. Before lunch       Start Date: 9/18/2023 End Date: 9/17/2024    ONDANSETRON (ZOFRAN-ODT) 4 MG TBDL    Take 1 tablet (4 mg total) by mouth every 8 (eight) hours as needed (nausea).       Start Date: 4/18/2024 End Date: --    SPIRONOLACTONE (ALDACTONE) 50 MG TABLET    Take 50 mg by mouth 2 (two) times daily.       Start Date: 5/4/2023  End Date: --   Discontinued Medications    HYDROCHLOROTHIAZIDE (HYDRODIURIL) 25 MG TABLET    Take 1 tablet (25 mg total) by mouth once daily.       Start Date: 6/19/2023 End Date: 5/21/2024    TIRZEPATIDE, WEIGHT LOSS, (ZEPBOUND) 5 MG/0.5 ML PNIJ    Inject 5 mg into the skin every 7 days.       Start Date: 5/16/2024 End Date: 5/21/2024    TIRZEPATIDE, WEIGHT LOSS, (ZEPBOUND) 7.5 MG/0.5 ML PNIJ    Inject 7.5 mg into the skin once a week.       Start Date: 5/20/2024 End Date: 5/21/2024          Follow up in about 16 days (around 6/6/2024) for Wellness, Obesity Followup.      Audio/Video Time Documentation:  Spent 18 minutes for telemedicine visit with successful audio/visual connection. [facility] was used for billing.

## 2024-05-23 ENCOUNTER — PATIENT MESSAGE (OUTPATIENT)
Dept: FAMILY MEDICINE | Facility: CLINIC | Age: 54
End: 2024-05-23
Payer: COMMERCIAL

## 2024-05-23 LAB
THYROGLOB AB SERPL IA-ACNC: <12 IU/ML
THYROID PEROXIDASE QUANT (OLG): <4 IU/ML

## 2024-05-28 ENCOUNTER — PATIENT MESSAGE (OUTPATIENT)
Dept: FAMILY MEDICINE | Facility: CLINIC | Age: 54
End: 2024-05-28
Payer: COMMERCIAL

## 2024-05-29 DIAGNOSIS — I10 PRIMARY HYPERTENSION: Primary | ICD-10-CM

## 2024-05-29 RX ORDER — HYDROCHLOROTHIAZIDE 12.5 MG/1
12.5 TABLET ORAL DAILY
Qty: 90 TABLET | Refills: 3 | Status: SHIPPED | OUTPATIENT
Start: 2024-05-29 | End: 2024-06-10

## 2024-05-31 ENCOUNTER — TELEPHONE (OUTPATIENT)
Dept: FAMILY MEDICINE | Facility: CLINIC | Age: 54
End: 2024-05-31
Payer: COMMERCIAL

## 2024-05-31 NOTE — TELEPHONE ENCOUNTER
Are there any outstanding tasks in the patients's chart (ex.labs,MM,etc)?  no  Do we have outstanding/pending referrals?  no  Has the patient been seen in and ER,UCC, or been admitted since last visit?  no  Has the patient seen any other health care provider(doctors) since last visit?  no  Has the patient had any bloodwork or x-rays done since last visit?   yes

## 2024-06-10 ENCOUNTER — PATIENT MESSAGE (OUTPATIENT)
Dept: FAMILY MEDICINE | Facility: CLINIC | Age: 54
End: 2024-06-10
Payer: COMMERCIAL

## 2024-06-10 DIAGNOSIS — I10 PRIMARY HYPERTENSION: Primary | ICD-10-CM

## 2024-06-10 RX ORDER — HYDROCHLOROTHIAZIDE 25 MG/1
25 TABLET ORAL DAILY
Qty: 90 TABLET | Refills: 3 | Status: SHIPPED | OUTPATIENT
Start: 2024-06-10 | End: 2025-06-10

## 2024-06-19 ENCOUNTER — PATIENT MESSAGE (OUTPATIENT)
Dept: FAMILY MEDICINE | Facility: CLINIC | Age: 54
End: 2024-06-19
Payer: COMMERCIAL

## 2024-06-19 DIAGNOSIS — E66.01 CLASS 3 SEVERE OBESITY DUE TO EXCESS CALORIES WITH SERIOUS COMORBIDITY AND BODY MASS INDEX (BMI) OF 45.0 TO 49.9 IN ADULT: ICD-10-CM

## 2024-06-19 RX ORDER — TIRZEPATIDE 10 MG/.5ML
10 INJECTION, SOLUTION SUBCUTANEOUS
Qty: 2 ML | Refills: 1 | Status: SHIPPED | OUTPATIENT
Start: 2024-06-19 | End: 2024-08-18

## 2024-06-20 RX ORDER — LISINOPRIL 10 MG/1
10 TABLET ORAL EVERY MORNING
Qty: 90 TABLET | Refills: 3 | Status: SHIPPED | OUTPATIENT
Start: 2024-06-20

## 2024-06-29 DIAGNOSIS — R11.0 NAUSEA: ICD-10-CM

## 2024-07-01 RX ORDER — ONDANSETRON 4 MG/1
TABLET, ORALLY DISINTEGRATING ORAL
Qty: 30 TABLET | Refills: 0 | Status: SHIPPED | OUTPATIENT
Start: 2024-07-01

## 2024-07-09 ENCOUNTER — E-VISIT (OUTPATIENT)
Dept: FAMILY MEDICINE | Facility: CLINIC | Age: 54
End: 2024-07-09
Payer: COMMERCIAL

## 2024-07-09 DIAGNOSIS — R11.0 NAUSEA: ICD-10-CM

## 2024-07-09 RX ORDER — ONDANSETRON 4 MG/1
4 TABLET, ORALLY DISINTEGRATING ORAL EVERY 8 HOURS PRN
Qty: 30 TABLET | Refills: 0 | Status: SHIPPED | OUTPATIENT
Start: 2024-07-09

## 2024-07-09 NOTE — PROGRESS NOTES
Patient ID: Dolly Romero is a 54 y.o. female.    Chief Complaint: GI Problem (Entered automatically based on patient selection in Patient Portal.)    The patient initiated a request through [employer] on 7/9/2024 for evaluation and management with a chief complaint of GI Problem (Entered automatically based on patient selection in Patient Portal.)     I evaluated the questionnaire submission on 7/9/24.    [facility] Peq Evisit Diarrhea    7/9/2024  8:21 AM CDT - Filed by Patient   Do you agree to participate in an E-Visit? Yes   If you have any of the following symptoms, please present to your local emergency room or call 911:  I acknowledge   Are you pregnant, could you be pregnant, or are you breast feeding? None of the above   What is the main issue you would like addressed today? Nausea, feeling sick in the stomach, headaches   Do you have diarrhea? No   Are you vomiting? No, I am not vomiting   Do you have belly pain? I have a little pain or no pain   Are you feeling dizzy or like you might pass out? No   When did your symptoms begin? 7/8/2024   Do you have a fever? No, I do not have a fever   Are you having trouble walking or lifting yourself due to weakness from this illness?  No   Do any of the following apply to you? My urine is normal   Did your condition begin after a specific meal that may have caused the illness? Not clearly related to a meal.    Have you taken antibiotics recently? I have not been on any antibiotics   Have you been hospitalized in the past 2 months? No, I have not been hospitalized recently.   Do you work in a  center or healthcare environment? No   Does anyone you know have similar symptoms? No   Have you had a meal consisting of raw meat or fish in the week prior to your illness? No   Have you recently travelled to a place where you may have caught an illness? No   Have you tried any medication or other treatment for your symptoms? No   Provide any additional information  you feel is important. I think its nausea and ill feeling related to Zepbound, I may just need Zolfran, the 10mg is working good.   Please attach any relevant images or files    Are you able to take your vital signs? Yes   Systolic Blood Pressure: 134   Diastolic Blood Pressure: 87   Weight: 305   Height:    Pulse:    Temperature:    Respiration rate:    Pulse Oxygen:          Encounter Diagnosis   Name Primary?    Nausea         No orders of the defined types were placed in this encounter.     Medications Ordered This Encounter   Medications    ondansetron (ZOFRAN-ODT) 4 MG TbDL     Sig: Take 1 tablet (4 mg total) by mouth every 8 (eight) hours as needed.     Dispense:  30 tablet     Refill:  0        No follow-ups on file.      E-Visit Time Trackin mins

## 2024-07-23 ENCOUNTER — OFFICE VISIT (OUTPATIENT)
Dept: FAMILY MEDICINE | Facility: CLINIC | Age: 54
End: 2024-07-23
Payer: COMMERCIAL

## 2024-07-23 VITALS — SYSTOLIC BLOOD PRESSURE: 130 MMHG | WEIGHT: 293 LBS | DIASTOLIC BLOOD PRESSURE: 80 MMHG | BODY MASS INDEX: 47.77 KG/M2

## 2024-07-23 DIAGNOSIS — L68.0 FEMALE HIRSUTISM: ICD-10-CM

## 2024-07-23 DIAGNOSIS — R11.0 NAUSEA: ICD-10-CM

## 2024-07-23 DIAGNOSIS — E66.01 CLASS 3 SEVERE OBESITY DUE TO EXCESS CALORIES WITH SERIOUS COMORBIDITY AND BODY MASS INDEX (BMI) OF 45.0 TO 49.9 IN ADULT: Primary | ICD-10-CM

## 2024-07-23 PROCEDURE — 3044F HG A1C LEVEL LT 7.0%: CPT | Mod: CPTII,95,, | Performed by: FAMILY MEDICINE

## 2024-07-23 PROCEDURE — 1159F MED LIST DOCD IN RCRD: CPT | Mod: CPTII,95,, | Performed by: FAMILY MEDICINE

## 2024-07-23 PROCEDURE — 99214 OFFICE O/P EST MOD 30 MIN: CPT | Mod: 95,,, | Performed by: FAMILY MEDICINE

## 2024-07-23 PROCEDURE — 4010F ACE/ARB THERAPY RXD/TAKEN: CPT | Mod: CPTII,95,, | Performed by: FAMILY MEDICINE

## 2024-07-23 PROCEDURE — 3075F SYST BP GE 130 - 139MM HG: CPT | Mod: CPTII,95,, | Performed by: FAMILY MEDICINE

## 2024-07-23 PROCEDURE — 3079F DIAST BP 80-89 MM HG: CPT | Mod: CPTII,95,, | Performed by: FAMILY MEDICINE

## 2024-07-23 PROCEDURE — 3008F BODY MASS INDEX DOCD: CPT | Mod: CPTII,95,, | Performed by: FAMILY MEDICINE

## 2024-07-23 RX ORDER — ONDANSETRON 4 MG/1
4 TABLET, ORALLY DISINTEGRATING ORAL EVERY 8 HOURS PRN
Qty: 30 TABLET | Refills: 2 | Status: SHIPPED | OUTPATIENT
Start: 2024-07-23

## 2024-07-23 RX ORDER — SPIRONOLACTONE 50 MG/1
50 TABLET, FILM COATED ORAL 2 TIMES DAILY
Qty: 180 TABLET | Refills: 3 | Status: SHIPPED | OUTPATIENT
Start: 2024-07-23 | End: 2025-07-23

## 2024-07-23 NOTE — PROGRESS NOTES
Patient ID: [id number]     Chief Complaint: Follow-up and Obesity        HPI:     This is a telemedicine note. Patient was treated using telemedicine, real time audio and video, according to [facility] protocols. Iliana MORSE M.D. , conducted the visit from the [facility]. The patient participated in the visit at a non-[facility] location selected by the patient, identified below. I am licensed in the state where the patient stated they are located. The patient stated that they understood and accepted the privacy and security risks to their information at their location. This visit is not recorded.    Patient was located at the patient's home.     Dolly Romero is a 54 y.o. female here today for a telemedicine visit.    - She is morbidly obese and has pre-diabetes and she refuses weight loss surgery, she is awaiting an appointment with a dietician. She has pre-diabetes, she is taking Zepbound 10 mg weekly, she has lost 7 pounds since last visit, she reports mild nausea, no vomiting, does well with Zofran, no side effects, she needs Rx refill of Zofran today, she would like to keep the Zepbound at the 10mg weekly dose for now. She denies family history of MEN II or medullary thyroid cancer or personal history of pancreatitis, she is not trying to get pregnant.  - Hirsutism is controlled with Rx, Aldactone, no side effects, was previously prescribed by her GYN, she needs Rx refill.   - Patient is without any other complaints today.         -------------------------------------    PCOS (polycystic ovarian syndrome)        Past Surgical History:   Procedure Laterality Date    ANGIOGRAM  EXTREMITY BILATERAL Right        Review of patient's allergies indicates:  No Known Allergies    Outpatient Medications Marked as Taking for the 7/23/24 encounter (Office Visit) with Iliana Pelletier MD   Medication Sig Dispense Refill    albuterol (PROVENTIL/VENTOLIN HFA) 90 mcg/actuation inhaler Inhale 2  puffs into the lungs every 6 (six) hours as needed.      aspirin 81 mg Cap Take 1 tablet by mouth once daily.      celecoxib (CELEBREX) 200 MG capsule Take 1 capsule (200 mg total) by mouth after lunch. 30 capsule 5    cholecalciferol, vitamin D3, (VITAMIN D3) 50 mcg (2,000 unit) Tab Take 2,000 Units by mouth once daily.      diclofenac (VOLTAREN) 50 MG EC tablet Take 50 mg by mouth 2 (two) times a day.      diclofenac sodium (VOLTAREN) 1 % Gel Apply 4 g topically 2 (two) times a day.      FEROSUL 325 mg (65 mg iron) Tab tablet Take 325 mg by mouth every other day.      fluticasone propionate (FLONASE) 50 mcg/actuation nasal spray 1 spray (50 mcg total) by Each Nostril route 2 (two) times a day. 16 g 11    hydroCHLOROthiazide (HYDRODIURIL) 25 MG tablet Take 1 tablet (25 mg total) by mouth once daily. 90 tablet 3    lisinopriL 10 MG tablet TAKE 1 TABLET(10 MG) BY MOUTH EVERY MORNING 90 tablet 3    magnesium 30 mg Tab Take 30 mg by mouth 2 (two) times a day.      methylPREDNISolone (MEDROL) 4 MG Tab Take 4 mg by mouth once daily.      omeprazole (PRILOSEC) 40 MG capsule Take 1 capsule (40 mg total) by mouth once daily. Before lunch 30 capsule 11    tirzepatide, weight loss, (ZEPBOUND) 10 mg/0.5 mL PnIj Inject 10 mg into the skin every 7 days. 2 mL 1    [DISCONTINUED] ondansetron (ZOFRAN-ODT) 4 MG TbDL Take 1 tablet (4 mg total) by mouth every 8 (eight) hours as needed. 30 tablet 0    [DISCONTINUED] spironolactone (ALDACTONE) 50 MG tablet Take 50 mg by mouth 2 (two) times daily.         Social History     Socioeconomic History    Marital status: Single   Tobacco Use    Smoking status: Never    Smokeless tobacco: Never   Substance and Sexual Activity    Alcohol use: Not Currently    Drug use: Never    Sexual activity: Yes     Partners: Male     Social Determinants of Health     Financial Resource Strain: Medium Risk (1/31/2024)    Overall Financial Resource Strain (CARDIA)     Difficulty of Paying Living Expenses:  Somewhat hard   Food Insecurity: No Food Insecurity (1/31/2024)    Hunger Vital Sign     Worried About Running Out of Food in the Last Year: Never true     Ran Out of Food in the Last Year: Never true   Transportation Needs: No Transportation Needs (1/31/2024)    PRAPARE - Transportation     Lack of Transportation (Medical): No     Lack of Transportation (Non-Medical): No   Physical Activity: Insufficiently Active (1/31/2024)    Exercise Vital Sign     Days of Exercise per Week: 4 days     Minutes of Exercise per Session: 30 min   Stress: Stress Concern Present (1/31/2024)    [employer] [employer] - Occupational Stress Questionnaire     Feeling of Stress : To some extent   Housing Stability: Low Risk  (1/31/2024)    Housing Stability Vital Sign     Unable to Pay for Housing in the Last Year: No     Number of Places Lived in the Last Year: 1     Unstable Housing in the Last Year: No        Family History   Problem Relation Name Age of Onset    Hypertension Mother      Diabetes Mother      Arthritis Mother      Pancreatic cancer Mother      Heart disease Father      Prostate cancer Father          Subjective:       See HPI for details    Constitutional: Denies Change in appetite. Denies Chills. Denies Fever. Denies Night sweats.  Eye: Denies Blurred vision. Denies Discharge. Denies Eye pain.  ENT: Denies Decreased hearing. Denies Sore throat. Denies Swollen glands.  Respiratory: Denies Cough. Denies Shortness of breath. Denies Shortness of breath with exertion. Denies Wheezing.  Cardiovascular: Denies Chest pain at rest. Denies Chest pain with exertion. Denies Irregular heartbeat. Denies Palpitations.  Gastrointestinal: Denies Abdominal pain. Denies Diarrhea. Mild Nausea. Denies Vomiting. Denies Hematemesis or Hematochezia.  Genitourinary: Denies Dysuria. Denies Urinary frequency. Denies Urinary urgency. Denies Blood in urine.  Endocrine: Denies Cold intolerance. Denies Excessive thirst. Denies Heat  intolerance. Denies Weight loss. Denies Weight gain.  Musculoskeletal: Denies Painful joints. Denies Weakness.  Integumentary: Denies Rash. Denies Itching. Denies Dry skin.  Neurologic: Denies Dizziness. Denies Fainting. Denies Headache.  Psychiatric: Denies Depression. Denies Anxiety. Denies Suicidal/Homicidal ideations.    All Other ROS: Negative except as stated in HPI.   Answers submitted by the patient for this visit:  Review of Systems Questionnaire (Submitted on 7/23/2024)  activity change: No  unexpected weight change: No  neck pain: No  hearing loss: No  rhinorrhea: No  trouble swallowing: No  eye discharge: No  visual disturbance: No  chest tightness: No  wheezing: No  chest pain: No  palpitations: No  blood in stool: No  constipation: No  vomiting: No  diarrhea: No  polydipsia: No  polyuria: No  difficulty urinating: No  hematuria: No  menstrual problem: No  dysuria: No  joint swelling: No  arthralgias: Yes  headaches: Yes  weakness: No  confusion: No  dysphoric mood: Yes      Objective:     /80 Comment: per patient  Wt (!) 138.3 kg (305 lb)   LMP  (LMP Unknown)   BMI 47.77 kg/m²     Physical Exam    Physical Exam: LIMITED DUE TO TELEMEDICINE RESTRICTIONS.  General: Alert and oriented, No acute distress. Obese.   Head: Normocephalic, Atraumatic.  Eye: Sclera non-icteric.  Neck/Thyroid:  Full range of motion.  Respiratory: Non-labored respirations, Symmetrical chest wall expansion.  Musculoskeletal: Normal range of motion.  Integumentary: Warm, Dry, Intact, No visible suspicious lesions or rashes. No diaphoresis.   Neurologic: No focal deficits  Psychiatric: Normal interaction, Coherent speech, Euthymic mood, Appropriate affect         Assessment:       ICD-10-CM ICD-9-CM   1. Class 3 severe obesity due to excess calories with serious comorbidity and body mass index (BMI) of 45.0 to 49.9 in adult  E66.01 278.01    Z68.42 V85.42   2. Nausea  R11.0 787.02   3. Female hirsutism  L68.0 704.1         Plan:     Problem List Items Addressed This Visit          Derm    Female hirsutism    Relevant Medications    spironolactone (ALDACTONE) 50 MG tablet     Other Visit Diagnoses       Class 3 severe obesity due to excess calories with serious comorbidity and body mass index (BMI) of 45.0 to 49.9 in adult    -  Primary    Nausea        Relevant Medications    ondansetron (ZOFRAN-ODT) 4 MG TbDL         1. Class 3 severe obesity due to excess calories with serious comorbidity and body mass index (BMI) of 45.0 to 49.9 in adult  - Diet, exercise, and 10% weight loss encouraged. Continue Zepbound 10 mg weekly with close monitoring to help with pre-diabetes and obesity. She agrees to avoid pregnancy with Zepbound. Will titrate Rx Zepbound as needed/tolerated until max dose achieved. Recheck CMP, HgA1C with annual wellness labs. Notify M.D. or ER if symptoms persist or worsen, adverse Rx side effects, pancreatitis, temp greater than 100.4, or any acute illness.    Body mass index is 47.77 kg/m².  Goal BMI <30.  Exercise 5 times a week for 30 minutes per day.  Avoid soda, simple sugars, excessive rice, potatoes or bread. Limit fast foods and fried foods.  Choose complex carbs in moderation (example: green vegetables, beans, oatmeal). Eat plenty of fresh fruits and vegetables with lean meats daily.  Do not skip meals. Eat a balanced portion size.  Avoid fad diets. Consider permanent healthy life style changes.      2. Nausea  - Rx ondansetron (ZOFRAN-ODT) 4 MG TbDL; Take 1 tablet (4 mg total) by mouth every 8 (eight) hours as needed (nausea).  Dispense: 30 tablet; Refill: 2 refilled today, same as #1.     3. Female hirsutism  - Rx spironolactone (ALDACTONE) 50 MG tablet; Take 1 tablet (50 mg total) by mouth 2 (two) times daily refilled today.       Dolly was seen today for follow-up and obesity.    Diagnoses and all orders for this visit:    Class 3 severe obesity due to excess calories with serious comorbidity and body  mass index (BMI) of 45.0 to 49.9 in adult    Nausea  -     ondansetron (ZOFRAN-ODT) 4 MG TbDL; Take 1 tablet (4 mg total) by mouth every 8 (eight) hours as needed (nausea).    Female hirsutism  -     spironolactone (ALDACTONE) 50 MG tablet; Take 1 tablet (50 mg total) by mouth 2 (two) times daily.          Medication List with Changes/Refills   Current Medications    ALBUTEROL (PROVENTIL/VENTOLIN HFA) 90 MCG/ACTUATION INHALER    Inhale 2 puffs into the lungs every 6 (six) hours as needed.       Start Date: 4/3/2023  End Date: --    ASPIRIN 81 MG CAP    Take 1 tablet by mouth once daily.       Start Date: --        End Date: --    CELECOXIB (CELEBREX) 200 MG CAPSULE    Take 1 capsule (200 mg total) by mouth after lunch.       Start Date: 9/18/2023 End Date: --    CHOLECALCIFEROL, VITAMIN D3, (VITAMIN D3) 50 MCG (2,000 UNIT) TAB    Take 2,000 Units by mouth once daily.       Start Date: --        End Date: --    DICLOFENAC (VOLTAREN) 50 MG EC TABLET    Take 50 mg by mouth 2 (two) times a day.       Start Date: 5/29/2023 End Date: --    DICLOFENAC SODIUM (VOLTAREN) 1 % GEL    Apply 4 g topically 2 (two) times a day.       Start Date: 5/30/2023 End Date: --    FEROSUL 325 MG (65 MG IRON) TAB TABLET    Take 325 mg by mouth every other day.       Start Date: 4/21/2023 End Date: --    FLUTICASONE PROPIONATE (FLONASE) 50 MCG/ACTUATION NASAL SPRAY    1 spray (50 mcg total) by Each Nostril route 2 (two) times a day.       Start Date: 5/8/2023  End Date: --    HYDROCHLOROTHIAZIDE (HYDRODIURIL) 25 MG TABLET    Take 1 tablet (25 mg total) by mouth once daily.       Start Date: 6/10/2024 End Date: 6/10/2025    LISINOPRIL 10 MG TABLET    TAKE 1 TABLET(10 MG) BY MOUTH EVERY MORNING       Start Date: 6/20/2024 End Date: --    MAGNESIUM 30 MG TAB    Take 30 mg by mouth 2 (two) times a day.       Start Date: --        End Date: --    METHYLPREDNISOLONE (MEDROL) 4 MG TAB    Take 4 mg by mouth once daily.       Start Date: 3/18/2024 End  Date: --    OMEPRAZOLE (PRILOSEC) 40 MG CAPSULE    Take 1 capsule (40 mg total) by mouth once daily. Before lunch       Start Date: 9/18/2023 End Date: 9/17/2024    TIRZEPATIDE, WEIGHT LOSS, (ZEPBOUND) 10 MG/0.5 ML PNIJ    Inject 10 mg into the skin every 7 days.       Start Date: 6/19/2024 End Date: 8/18/2024   Changed and/or Refilled Medications    Modified Medication Previous Medication    ONDANSETRON (ZOFRAN-ODT) 4 MG TBDL ondansetron (ZOFRAN-ODT) 4 MG TbDL       Take 1 tablet (4 mg total) by mouth every 8 (eight) hours as needed (nausea).    Take 1 tablet (4 mg total) by mouth every 8 (eight) hours as needed.       Start Date: 7/23/2024 End Date: --    Start Date: 7/9/2024  End Date: 7/23/2024    SPIRONOLACTONE (ALDACTONE) 50 MG TABLET spironolactone (ALDACTONE) 50 MG tablet       Take 1 tablet (50 mg total) by mouth 2 (two) times daily.    Take 50 mg by mouth 2 (two) times daily.       Start Date: 7/23/2024 End Date: 7/23/2025    Start Date: 5/4/2023  End Date: 7/23/2024          Follow up in about 2 months (around 10/4/2024) for Wellness, Obesity Followup.      Audio/Video Time Documentation:  Spent 24 minutes for telemedicine visit with successful audio/visual connection. [facility] was used for billing.

## 2024-07-24 ENCOUNTER — PATIENT MESSAGE (OUTPATIENT)
Dept: FAMILY MEDICINE | Facility: CLINIC | Age: 54
End: 2024-07-24
Payer: COMMERCIAL

## 2024-08-11 DIAGNOSIS — E66.01 CLASS 3 SEVERE OBESITY DUE TO EXCESS CALORIES WITH SERIOUS COMORBIDITY AND BODY MASS INDEX (BMI) OF 45.0 TO 49.9 IN ADULT: ICD-10-CM

## 2024-08-12 DIAGNOSIS — E66.01 CLASS 3 SEVERE OBESITY DUE TO EXCESS CALORIES WITH SERIOUS COMORBIDITY AND BODY MASS INDEX (BMI) OF 45.0 TO 49.9 IN ADULT: ICD-10-CM

## 2024-08-12 RX ORDER — TIRZEPATIDE 10 MG/.5ML
10 INJECTION, SOLUTION SUBCUTANEOUS
Qty: 4 ML | Refills: 2 | Status: SHIPPED | OUTPATIENT
Start: 2024-08-12 | End: 2024-08-14 | Stop reason: SDUPTHER

## 2024-08-12 RX ORDER — TIRZEPATIDE 10 MG/.5ML
INJECTION, SOLUTION SUBCUTANEOUS
Qty: 4 ML | Refills: 2 | Status: SHIPPED | OUTPATIENT
Start: 2024-08-12 | End: 2024-08-12 | Stop reason: SDUPTHER

## 2024-08-14 DIAGNOSIS — E66.01 CLASS 3 SEVERE OBESITY DUE TO EXCESS CALORIES WITH SERIOUS COMORBIDITY AND BODY MASS INDEX (BMI) OF 45.0 TO 49.9 IN ADULT: ICD-10-CM

## 2024-08-14 RX ORDER — TIRZEPATIDE 10 MG/.5ML
10 INJECTION, SOLUTION SUBCUTANEOUS
Qty: 4 ML | Refills: 2 | Status: SHIPPED | OUTPATIENT
Start: 2024-08-14

## 2024-09-25 ENCOUNTER — E-VISIT (OUTPATIENT)
Dept: FAMILY MEDICINE | Facility: CLINIC | Age: 54
End: 2024-09-25
Payer: COMMERCIAL

## 2024-09-25 DIAGNOSIS — R39.9 UTI SYMPTOMS: Primary | ICD-10-CM

## 2024-09-25 RX ORDER — PHENAZOPYRIDINE HYDROCHLORIDE 200 MG/1
200 TABLET, FILM COATED ORAL 3 TIMES DAILY PRN
Qty: 6 TABLET | Refills: 0 | Status: SHIPPED | OUTPATIENT
Start: 2024-09-25 | End: 2024-09-27

## 2024-09-25 RX ORDER — CIPROFLOXACIN 500 MG/1
500 TABLET ORAL EVERY 12 HOURS
Qty: 14 TABLET | Refills: 0 | Status: SHIPPED | OUTPATIENT
Start: 2024-09-25 | End: 2024-10-02

## 2024-09-25 NOTE — PROGRESS NOTES
Patient ID: Dolly Romero is a 54 y.o. female.    Chief Complaint: General Illness (Entered automatically based on patient selection in [employer].)    The patient initiated a request through [employer] on 9/25/2024 for evaluation and management with a chief complaint of General Illness (Entered automatically based on patient selection in [employer].)     I evaluated the questionnaire submission on 09/25/2024.    [facility] P[facility] Evisit [facility]-Common Problems    9/25/2024 11:20 AM CDT - Filed by Patient   What do you need help with? Urinary Symptoms   Do you agree to participate in an E-Visit? Yes   If you have any of the following symptoms, please present to your local emergency room or call 911:  I acknowledge   Are you pregnant, could you be pregnant, or are you breast feeding? None of the above   What is the main issue you would like addressed today? Darker than usual urine and a burning sensation after urinating   What symptoms do you currently have? Change in urine appearance or smell   When did your symptoms first appear? 9/25/2024   List what you have done or taken to help your symptoms. I noticed my urine was darker than normal the last two days, just used the bathroom and while urinating I noticed a slight burning sensation.   Please indicate whether you have had the following symptoms during the past 24 hours     Urgent urination (a sudden and uncontrollable urge to urinate) None   Frequent urination of small amounts of urine (going to the toilet very often) None   Burning pain when urinating Moderate   Incomplete bladder emptying (still feel like you need to urinate again after urination) None   Pain not associated with urination in the lower abdomen below the belly button) None   What does your urine look like? Clear   Blood seen in the urine None   Flank pain (pain in one or both sides of the lower back) None   Abnormal Vaginal Discharge (abnormal amount, color and/or odor) None   Discharge from the  urethra (urinary opening) without urination None   High body temperature/fever? None-<99.5   Please rate how much discomfort you have experience because of the symptoms in the past 24 hours: Mild   Please indicate how the symptoms have interfered with your every day activities/work in the past 24 hours: None   Please indicate how these symptoms have interfered with your social activities (visiting people, meeting with friends, etc.) in the past 24 hours? None   Are you a diabetic? No   Please indicate whether you have the following at the time of completion of this questionnaire: None of the above   Provide any additional information you feel is important. Just darker yellow urine, clear and there was only burning sensation after use, first time ever having this issue. Concerned it is a Urinary Tract Infection starting.   Please attach any relevant images or files (if you have performed a home test for UTI, please submit a photo of results)    Are you able to take your vital signs? Yes   Systolic Blood Pressure: 117   Diastolic Blood Pressure: 68   Weight: 298   Height:    Pulse: 67   Temperature:    Respiration rate:    Pulse Oxygen:          Encounter Diagnosis   Name Primary?    UTI symptoms Yes        Orders Placed This Encounter   Procedures    Urine Culture High Risk     Standing Status:   Future     Standing Expiration Date:   12/24/2025    Urinalysis     Standing Status:   Future     Standing Expiration Date:   11/24/2025     Order Specific Question:   Collection Type     Answer:   Urine, Clean Catch      Medications Ordered This Encounter   Medications    ciprofloxacin HCl (CIPRO) 500 MG tablet     Sig: Take 1 tablet (500 mg total) by mouth every 12 (twelve) hours. for 7 days     Dispense:  14 tablet     Refill:  0    phenazopyridine (PYRIDIUM) 200 MG tablet     Sig: Take 1 tablet (200 mg total) by mouth 3 (three) times daily as needed for Pain.     Dispense:  6 tablet     Refill:  0      I ordered a  urinalysis and urine culture to evaluate for possible urinary tract infection, she can have this testing done at any [facility] facility of her choice and at her earliest convenience, except for Urgent Care Centers. I recommend she have this done prior to starting the medication that way if she has a bladder infection, I can capture it before the antibiotics mask a possible infection. I did send a prescription for an antibiotic called Cipro and Pyridium to help ease her bladder tract. UTI precautions encouraged. The Pyridium may change the color of your urine to orange or blue, please do not be alarmed as this is coming from the medication. Increase fluid intake. Notify M.D. or ER if symptoms persist or worsen, abdominal pain, bloody urine, vomiting, vaginal discharge, temp >100.4, or any acute illness.      Follow up if symptoms worsen or fail to improve.      E-Visit Time Tracking:    Day 1 Time (in minutes): 5    Total Time (in minutes): 5

## 2024-09-30 DIAGNOSIS — Z00.00 LABORATORY EXAMINATION ORDERED AS PART OF A ROUTINE GENERAL MEDICAL EXAMINATION: Primary | ICD-10-CM

## 2024-10-03 ENCOUNTER — LAB VISIT (OUTPATIENT)
Dept: LAB | Facility: HOSPITAL | Age: 54
End: 2024-10-03
Attending: FAMILY MEDICINE
Payer: COMMERCIAL

## 2024-10-03 DIAGNOSIS — R39.9 UTI SYMPTOMS: ICD-10-CM

## 2024-10-03 DIAGNOSIS — Z00.00 LABORATORY EXAMINATION ORDERED AS PART OF A ROUTINE GENERAL MEDICAL EXAMINATION: ICD-10-CM

## 2024-10-03 LAB
25(OH)D3+25(OH)D2 SERPL-MCNC: 46 NG/ML (ref 30–80)
ALBUMIN SERPL-MCNC: 3.5 G/DL (ref 3.5–5)
ALBUMIN/GLOB SERPL: 1.1 RATIO (ref 1.1–2)
ALP SERPL-CCNC: 100 UNIT/L (ref 40–150)
ALT SERPL-CCNC: 13 UNIT/L (ref 0–55)
ANION GAP SERPL CALC-SCNC: 9 MEQ/L
AST SERPL-CCNC: 14 UNIT/L (ref 5–34)
BACTERIA #/AREA URNS AUTO: ABNORMAL /HPF
BASOPHILS # BLD AUTO: 0.03 X10(3)/MCL
BASOPHILS NFR BLD AUTO: 0.5 %
BILIRUB SERPL-MCNC: 0.5 MG/DL
BILIRUB UR QL STRIP.AUTO: NEGATIVE
BUN SERPL-MCNC: 9.5 MG/DL (ref 9.8–20.1)
CALCIUM SERPL-MCNC: 9.1 MG/DL (ref 8.4–10.2)
CHLORIDE SERPL-SCNC: 104 MMOL/L (ref 98–107)
CHOLEST SERPL-MCNC: 197 MG/DL
CHOLEST/HDLC SERPL: 5 {RATIO} (ref 0–5)
CLARITY UR: CLEAR
CO2 SERPL-SCNC: 25 MMOL/L (ref 22–29)
COLOR UR AUTO: ABNORMAL
CREAT SERPL-MCNC: 0.89 MG/DL (ref 0.55–1.02)
CREAT/UREA NIT SERPL: 11
EOSINOPHIL # BLD AUTO: 0.09 X10(3)/MCL (ref 0–0.9)
EOSINOPHIL NFR BLD AUTO: 1.5 %
ERYTHROCYTE [DISTWIDTH] IN BLOOD BY AUTOMATED COUNT: 14.7 % (ref 11.5–17)
EST. AVERAGE GLUCOSE BLD GHB EST-MCNC: 102.5 MG/DL
GFR SERPLBLD CREATININE-BSD FMLA CKD-EPI: >60 ML/MIN/1.73/M2
GLOBULIN SER-MCNC: 3.3 GM/DL (ref 2.4–3.5)
GLUCOSE SERPL-MCNC: 104 MG/DL (ref 74–100)
GLUCOSE UR QL STRIP: NORMAL
HBA1C MFR BLD: 5.2 %
HCT VFR BLD AUTO: 42.2 % (ref 37–47)
HDLC SERPL-MCNC: 43 MG/DL (ref 35–60)
HGB BLD-MCNC: 13.6 G/DL (ref 12–16)
HGB UR QL STRIP: NEGATIVE
HYALINE CASTS #/AREA URNS LPF: ABNORMAL /LPF
IMM GRANULOCYTES # BLD AUTO: 0.01 X10(3)/MCL (ref 0–0.04)
IMM GRANULOCYTES NFR BLD AUTO: 0.2 %
KETONES UR QL STRIP: NEGATIVE
LDLC SERPL CALC-MCNC: 135 MG/DL (ref 50–140)
LEUKOCYTE ESTERASE UR QL STRIP: NEGATIVE
LYMPHOCYTES # BLD AUTO: 2.56 X10(3)/MCL (ref 0.6–4.6)
LYMPHOCYTES NFR BLD AUTO: 42 %
MCH RBC QN AUTO: 25.3 PG (ref 27–31)
MCHC RBC AUTO-ENTMCNC: 32.2 G/DL (ref 33–36)
MCV RBC AUTO: 78.4 FL (ref 80–94)
MONOCYTES # BLD AUTO: 0.59 X10(3)/MCL (ref 0.1–1.3)
MONOCYTES NFR BLD AUTO: 9.7 %
MUCOUS THREADS URNS QL MICRO: ABNORMAL /LPF
NEUTROPHILS # BLD AUTO: 2.82 X10(3)/MCL (ref 2.1–9.2)
NEUTROPHILS NFR BLD AUTO: 46.1 %
NITRITE UR QL STRIP: NEGATIVE
NRBC BLD AUTO-RTO: 0 %
PH UR STRIP: 6.5 [PH]
PLATELET # BLD AUTO: 314 X10(3)/MCL (ref 130–400)
PMV BLD AUTO: 11.3 FL (ref 7.4–10.4)
POTASSIUM SERPL-SCNC: 3.8 MMOL/L (ref 3.5–5.1)
PROT SERPL-MCNC: 6.8 GM/DL (ref 6.4–8.3)
PROT UR QL STRIP: NEGATIVE
RBC # BLD AUTO: 5.38 X10(6)/MCL (ref 4.2–5.4)
RBC #/AREA URNS AUTO: ABNORMAL /HPF
SODIUM SERPL-SCNC: 138 MMOL/L (ref 136–145)
SP GR UR STRIP.AUTO: 1.01 (ref 1–1.03)
SQUAMOUS #/AREA URNS LPF: ABNORMAL /HPF
TRIGL SERPL-MCNC: 96 MG/DL (ref 37–140)
TSH SERPL-ACNC: 3.13 UIU/ML (ref 0.35–4.94)
UROBILINOGEN UR STRIP-ACNC: NORMAL
VLDLC SERPL CALC-MCNC: 19 MG/DL
WBC # BLD AUTO: 6.1 X10(3)/MCL (ref 4.5–11.5)
WBC #/AREA URNS AUTO: ABNORMAL /HPF

## 2024-10-03 PROCEDURE — 87086 URINE CULTURE/COLONY COUNT: CPT | Performed by: FAMILY MEDICINE

## 2024-10-03 PROCEDURE — 83036 HEMOGLOBIN GLYCOSYLATED A1C: CPT

## 2024-10-03 PROCEDURE — 84443 ASSAY THYROID STIM HORMONE: CPT

## 2024-10-03 PROCEDURE — 36415 COLL VENOUS BLD VENIPUNCTURE: CPT

## 2024-10-03 PROCEDURE — 81001 URINALYSIS AUTO W/SCOPE: CPT

## 2024-10-03 PROCEDURE — 85025 COMPLETE CBC W/AUTO DIFF WBC: CPT

## 2024-10-03 PROCEDURE — 82306 VITAMIN D 25 HYDROXY: CPT

## 2024-10-03 PROCEDURE — 80061 LIPID PANEL: CPT

## 2024-10-03 PROCEDURE — 80053 COMPREHEN METABOLIC PANEL: CPT

## 2024-10-04 ENCOUNTER — OFFICE VISIT (OUTPATIENT)
Dept: FAMILY MEDICINE | Facility: CLINIC | Age: 54
End: 2024-10-04
Payer: COMMERCIAL

## 2024-10-04 VITALS
HEART RATE: 96 BPM | WEIGHT: 293 LBS | BODY MASS INDEX: 45.99 KG/M2 | SYSTOLIC BLOOD PRESSURE: 106 MMHG | RESPIRATION RATE: 16 BRPM | DIASTOLIC BLOOD PRESSURE: 73 MMHG | HEIGHT: 67 IN | OXYGEN SATURATION: 99 %

## 2024-10-04 DIAGNOSIS — G47.00 INSOMNIA, UNSPECIFIED TYPE: ICD-10-CM

## 2024-10-04 DIAGNOSIS — Z12.31 VISIT FOR SCREENING MAMMOGRAM: ICD-10-CM

## 2024-10-04 DIAGNOSIS — Z00.00 WELLNESS EXAMINATION: Primary | ICD-10-CM

## 2024-10-04 DIAGNOSIS — E66.01 CLASS 3 SEVERE OBESITY DUE TO EXCESS CALORIES WITH SERIOUS COMORBIDITY AND BODY MASS INDEX (BMI) OF 45.0 TO 49.9 IN ADULT: ICD-10-CM

## 2024-10-04 DIAGNOSIS — R82.71 BACTERIA IN URINE: ICD-10-CM

## 2024-10-04 DIAGNOSIS — E66.813 CLASS 3 SEVERE OBESITY DUE TO EXCESS CALORIES WITH SERIOUS COMORBIDITY AND BODY MASS INDEX (BMI) OF 45.0 TO 49.9 IN ADULT: ICD-10-CM

## 2024-10-04 DIAGNOSIS — Z23 FLU VACCINE NEED: ICD-10-CM

## 2024-10-04 DIAGNOSIS — I10 PRIMARY HYPERTENSION: ICD-10-CM

## 2024-10-04 DIAGNOSIS — Z13.6 ENCOUNTER FOR SCREENING FOR CARDIOVASCULAR DISORDERS: ICD-10-CM

## 2024-10-04 RX ORDER — TRAZODONE HYDROCHLORIDE 50 MG/1
50 TABLET ORAL NIGHTLY PRN
COMMUNITY
End: 2024-10-04 | Stop reason: SDUPTHER

## 2024-10-04 RX ORDER — TRAZODONE HYDROCHLORIDE 50 MG/1
50 TABLET ORAL NIGHTLY PRN
Qty: 30 TABLET | Refills: 11 | Status: SHIPPED | OUTPATIENT
Start: 2024-10-04 | End: 2025-10-04

## 2024-10-04 RX ORDER — TIRZEPATIDE 12.5 MG/.5ML
12.5 INJECTION, SOLUTION SUBCUTANEOUS
Qty: 2 ML | Refills: 2 | Status: SHIPPED | OUTPATIENT
Start: 2024-10-04 | End: 2025-01-02

## 2024-10-04 NOTE — PROGRESS NOTES
Patient ID: [id number]     Chief Complaint: Annual Exam        HPI:     Dolly Romero is a 54 y.o. female here today for annual wellness exam.   Well Adult History   The patient presents for well adult exam. The patient's general health status is described as fair. The patient's diet is described as balanced. Exercise: occasional. Associated symptoms consist of denies weight loss, denies weight gain, denies fatigue, denies headache, denies hearing loss and denies vision changes. Last menstrual period: 08/01/2024, perimenopausal with GYN (Dr. Prado)). Additional pertinent history: last dental exam: goes every 6 months, last eye exam: goes annually (wears eyeglasses), she would like referral to Ophthalmology, last pap smear : 07/27/2021 ([facility]- Dr. Benton), last mammogram: 10/2023 at [facility] ([facility]), she needs scheduled, she has negative Cologuard in 06/03/2022, negative, due for 06/2026, seat belt use, occasional caffeine use (soft drinks), tobacco use none, social alcohol use and She had labs done on 10/03/2024, here to discuss the results today. She needs flu vaccine, she will get Prevnar-20 at another time, but she is UTD on all other vaccines. HTN is well controlled with Rx, no side effects, asymptomatic,  BP at home is 100's/80's when she has her Rx, she denies chest pain, palpitations, or edema. She does see Cardiology (Dr. Ochoa) as scheduled. She would like calcium score done. She has ALEM, stable with CPAP, compliant with nightly CPAP with control of symptoms. She reports that asthma is well controlled and she hasn't had an asthma attack in over 1 year. She denies SOB, wheezing, or hemoptysis.  She has joint pain, stable, followed by PMR (Dr. Ackerman).  - Insomnia is controlled with Rx Trazodone, no side effects, she needs Rx Trazodone refill today.   - She is morbidly obese and has pre-diabetes and she refuses weight loss surgery, she is awaiting an appointment with a dietician.  She has pre-diabetes, she is taking Zepbound 10 mg weekly, she has lost 8 pounds since last visit, she reports mild nausea, no vomiting, does well with Zofran, no side effects, she would like to proceed with increased dose. She denies family history of MEN II or medullary thyroid cancer or personal history of pancreatitis, she is not trying to get pregnant.   - Patient is without any other complaints today.    Advance Care Planning     Date: 10/04/2024  Patient did not wish or was not able to name a surrogate decision maker or provide an Advance Care Plan.            -------------------------------------    PCOS (polycystic ovarian syndrome)        Past Surgical History:   Procedure Laterality Date    ANGIOGRAM  EXTREMITY BILATERAL Right        Review of patient's allergies indicates:  No Known Allergies    Outpatient Medications Marked as Taking for the 10/4/24 encounter (Office Visit) with Iliana Pelletier MD   Medication Sig Dispense Refill    albuterol (PROVENTIL/VENTOLIN HFA) 90 mcg/actuation inhaler Inhale 2 puffs into the lungs every 6 (six) hours as needed.      aspirin 81 mg Cap Take 1 tablet by mouth once daily.      cholecalciferol, vitamin D3, (VITAMIN D3) 50 mcg (2,000 unit) Tab Take 2,000 Units by mouth once daily.      FEROSUL 325 mg (65 mg iron) Tab tablet Take 325 mg by mouth every other day.      fluticasone propionate (FLONASE) 50 mcg/actuation nasal spray 1 spray (50 mcg total) by Each Nostril route 2 (two) times a day. 16 g 11    hydroCHLOROthiazide (HYDRODIURIL) 25 MG tablet Take 1 tablet (25 mg total) by mouth once daily. 90 tablet 3    lisinopriL 10 MG tablet TAKE 1 TABLET(10 MG) BY MOUTH EVERY MORNING 90 tablet 3    magnesium 30 mg Tab Take 30 mg by mouth 2 (two) times a day.      methylPREDNISolone (MEDROL) 4 MG Tab Take 4 mg by mouth once daily.      ondansetron (ZOFRAN-ODT) 4 MG TbDL Take 1 tablet (4 mg total) by mouth every 8 (eight) hours as needed (nausea). 30 tablet 2     spironolactone (ALDACTONE) 50 MG tablet Take 1 tablet (50 mg total) by mouth 2 (two) times daily. 180 tablet 3    [DISCONTINUED] tirzepatide, weight loss, (ZEPBOUND) 10 mg/0.5 mL PnIj Inject 10 mg into the skin every 7 days. 4 mL 2     Current Facility-Administered Medications for the 10/4/24 encounter (Office Visit) with Iliana Pelletier MD   Medication Dose Route Frequency Provider Last Rate Last Admin    influenza (Flulaval, Fluzone, Fluarix) 45 mcg/0.5 mL IM vaccine (> or = 6 mo) 0.5 mL  0.5 mL Intramuscular 1 time in Clinic/HOD            Social History     Socioeconomic History    Marital status: Single   Tobacco Use    Smoking status: Never    Smokeless tobacco: Never   Substance and Sexual Activity    Alcohol use: Never    Drug use: Never    Sexual activity: Yes     Partners: Male     Social Drivers of Health     Financial Resource Strain: Medium Risk (1/31/2024)    Overall Financial Resource Strain (CARDIA)     Difficulty of Paying Living Expenses: Somewhat hard   Food Insecurity: No Food Insecurity (1/31/2024)    Hunger Vital Sign     Worried About Running Out of Food in the Last Year: Never true     Ran Out of Food in the Last Year: Never true   Transportation Needs: No Transportation Needs (1/31/2024)    PRAPARE - Transportation     Lack of Transportation (Medical): No     Lack of Transportation (Non-Medical): No   Physical Activity: Insufficiently Active (1/31/2024)    Exercise Vital Sign     Days of Exercise per Week: 4 days     Minutes of Exercise per Session: 30 min   Stress: Stress Concern Present (1/31/2024)    [employer] [employer] - Occupational Stress Questionnaire     Feeling of Stress : To some extent   Housing Stability: Low Risk  (1/31/2024)    Housing Stability Vital Sign     Unable to Pay for Housing in the Last Year: No     Number of Places Lived in the Last Year: 1     Unstable Housing in the Last Year: No        Family History   Problem Relation Name Age of  Onset    Hypertension Mother      Diabetes Mother      Arthritis Mother      Pancreatic cancer Mother      Heart disease Father      Prostate cancer Father          Subjective:       Review of Systems:    See HPI for details    Constitutional: Denies Change in appetite. Denies Chills. Denies Fever. Denies Night sweats.  Eye: Denies Blurred vision. Denies Discharge. Denies Eye pain.  ENT: Denies Decreased hearing. Denies Sore throat. Denies Swollen glands.  Respiratory: Denies Cough. Denies Shortness of breath. Denies Shortness of breath with exertion. Denies Wheezing.  Cardiovascular: Denies Chest pain at rest. Denies Chest pain with exertion. Denies Irregular heartbeat. Denies Palpitations.  Gastrointestinal: Denies Abdominal pain. Denies Diarrhea. Denies Nausea. Denies Vomiting. Denies Hematemesis or Hematochezia.  Genitourinary: Denies Dysuria. Denies Urinary frequency. Denies Urinary urgency. Denies Blood in urine.  Endocrine: Denies Cold intolerance. Denies Excessive thirst. Denies Heat intolerance. Denies Weight loss. Denies Weight gain.  Musculoskeletal: Denies Painful joints. Denies Weakness.  Integumentary: Denies Rash. Denies Itching. Denies Dry skin.  Neurologic: Denies Dizziness. Denies Fainting. Denies Headache.  Psychiatric: Denies Depression. Denies Anxiety. Denies Suicidal/Homicidal ideations.    All Other ROS: Negative except as stated in HPI.   Answers submitted by the patient for this visit:  Review of Systems Questionnaire (Submitted on 10/1/2024)  activity change: No  unexpected weight change: No  hearing loss: No  rhinorrhea: No  trouble swallowing: No  eye discharge: No  visual disturbance: Yes  chest tightness: No  wheezing: No  chest pain: No  palpitations: No  blood in stool: No  constipation: Yes  vomiting: No  diarrhea: No  polydipsia: No  polyuria: No  difficulty urinating: No  hematuria: No  menstrual problem: Yes  dysuria: No  joint swelling: No  arthralgias: Yes  headaches:  "Yes  weakness: No  confusion: No  dysphoric mood: Yes      Objective:     /73 (BP Location: Right forearm, Patient Position: Sitting)   Pulse 96   Resp 16   Ht 5' 7" (1.702 m)   Wt 134.7 kg (297 lb)   LMP 08/01/2024 (Exact Date)   SpO2 99%   BMI 46.52 kg/m²     Physical Exam    General: Alert and oriented, No acute distress. Obese.   Head: Normocephalic, Atraumatic.  Eye: Pupils are equal, round and reactive to light, Extraocular movements are intact, Sclera non-icteric.  Ears/Nose/Throat: Normal, Mucosa moist,Clear.  Neck/Thyroid: Supple, Non-tender, No carotid bruit, No palpable thyromegaly or thyroid nodule, No lymphadenopathy, No JVD, Full range of motion.  Respiratory: Clear to auscultation bilaterally; No wheezes, rales or rhonchi,Non-labored respirations, Symmetrical chest wall expansion.  Cardiovascular: Regular rate and rhythm, S1/S2 normal, No murmurs, rubs or gallops.  Gastrointestinal: Soft, Non-tender, Non-distended, Normal bowel sounds, No palpable organomegaly.  Musculoskeletal: Normal range of motion.  Integumentary: Warm, Dry, Intact, No suspicious lesions or rashes.  Extremities: No clubbing, cyanosis or edema  Neurologic: No focal deficits, Cranial Nerves II-XII are grossly intact, Motor strength normal upper and lower extremities, Sensory exam intact.  Psychiatric: Normal interaction, Coherent speech, Euthymic mood, Appropriate affect     *Lab results from 10/03/2024 were reviewed and discussed with patient and patient voices understanding.*      The 10-year ASCVD risk score (Jose Eduardo DK, et al., 2019) is: 2.6%    Values used to calculate the score:      Age: 54 years      Sex: Female      Is Non- : Yes      Diabetic: No      Tobacco smoker: No      Systolic Blood Pressure: 106 mmHg      Is BP treated: Yes      HDL Cholesterol: 43 mg/dL      Total Cholesterol: 197 mg/dL       Assessment:       ICD-10-CM ICD-9-CM   1. Wellness examination  Z00.00 V70.0   2. Flu " vaccine need  Z23 V04.81   3. Visit for screening mammogram  Z12.31 V76.12   4. Primary hypertension  I10 401.9   5. Class 3 severe obesity due to excess calories with serious comorbidity and body mass index (BMI) of 45.0 to 49.9 in adult  E66.813 278.01    Z68.42 V85.42    E66.01    6. Encounter for screening for cardiovascular disorders  Z13.6 V81.2   7. Bacteria in urine  R82.71 791.9   8. Insomnia, unspecified type  G47.00 780.52        Plan:     Problem List Items Addressed This Visit          Other    Insomnia    Relevant Medications    traZODone (DESYREL) 50 MG tablet     Other Visit Diagnoses       Wellness examination    -  Primary    Relevant Orders    Ambulatory referral/consult to Ophthalmology    Flu vaccine need        Relevant Medications    influenza (Flulaval, Fluzone, Fluarix) 45 mcg/0.5 mL IM vaccine (> or = 6 mo) 0.5 mL    Visit for screening mammogram        Relevant Orders    Mammo Digital Screening Bilat w/ Chi    Primary hypertension        Relevant Orders    Comprehensive Metabolic Panel    CBC Auto Differential    Class 3 severe obesity due to excess calories with serious comorbidity and body mass index (BMI) of 45.0 to 49.9 in adult        Relevant Medications    tirzepatide, weight loss, (ZEPBOUND) 12.5 mg/0.5 mL PnIj    Encounter for screening for cardiovascular disorders        Relevant Orders    CT Calcium Scoring Cardiac    Bacteria in urine        Relevant Orders    Urine Culture High Risk         1. Wellness examination  - Ambulatory referral/consult to Ophthalmology; Future  -. Monthly breast self exam encouraged. Diet, exercise, and 10% weight loss encouraged. Keep appointment for dental exams x q6 months as scheduled. Keep appointment for annual eye exam as scheduled. Keep appointment with GYN for annual pap smear as scheduled. Keep appointment with specialists as scheduled. Notify M.D. or ER if temp greater than 100.4, or any acute illness.      2. Flu vaccine need  - influenza  (Flulaval, Fluzone, Fluarix) 45 mcg/0.5 mL IM vaccine (> or = 6 mo) 0.5 mL IM x 1 today    3. Visit for screening mammogram  - Mammo Digital Screening Bilat w/ Chi; Future  - Mammo Digital Screening Bilat w/ Chi    4. Primary hypertension  - Comprehensive Metabolic Panel; Future  - CBC Auto Differential; Future  - BP is well controlled, continue BP Rx Lisinopril, HCTZ as prescribed. Continue followup with Cardiology as scheduled. Keep daily BP log. Notify M.D. or ER if BP >170/100 or <90/60, chest pain, palpitations, headache, SOB, temp greater than 100.4, or any acute illness.   Continue  Low Sodium Diet (DASH Diet - Less than 2 grams of sodium per day).  Monitor blood pressure daily and log. Report consistent numbers greater than 140/90.  Smoking cessation encouraged to aid in BP reduction.  Maintain healthy weight with goal BMI <30. Exercise 30 minutes per day, 5 days per week.      5. Class 3 severe obesity due to excess calories with serious comorbidity and body mass index (BMI) of 45.0 to 49.9 in adult  - tirzepatide, weight loss, (ZEPBOUND) 12.5 mg/0.5 mL PnIj; Inject 12.5 mg into the skin every 7 days.  Dispense: 2 mL; Refill: 2  - Diet, exercise, and 10% weight loss encouraged. Rx trial of increased dose of Zepbound 12.5 mg weekly with close monitoring to help with pre-diabetes and obesity. She agrees to avoid pregnancy with Zepbound. Will titrate Rx Zepbound as needed/tolerated until max dose achieved. Recheck CMP, HgA1C with annual wellness labs. Notify M.D. or ER if symptoms persist or worsen, adverse Rx side effects, pancreatitis, temp greater than 100.4, or any acute illness.    Body mass index is 46.52 kg/m².  Goal BMI <30.  Exercise 5 times a week for 30 minutes per day.  Avoid soda, simple sugars, excessive rice, potatoes or bread. Limit fast foods and fried foods.  Choose complex carbs in moderation (example: green vegetables, beans, oatmeal). Eat plenty of fresh fruits and vegetables with lean  meats daily.  Do not skip meals. Eat a balanced portion size.  Avoid fad diets. Consider permanent healthy life style changes.      6. Encounter for screening for cardiovascular disorders  - CT Calcium Scoring Cardiac; Future    7. Bacteria in urine  - Urine Culture High Risk; Future; will treat pending results.     8. Insomnia, unspecified type  - Rx traZODone (DESYREL) 50 MG tablet; Take 1 tablet (50 mg total) by mouth nightly as needed for Insomnia.  Dispense: 30 tablet; Refill: 11 refilled today; proper sleep hygiene encouraged. Continue relaxation techniques. Will titrate medication as needed/tolerated. Notify M.D. or ER if symptoms persist or worsen, SI/HI, temp greater than 100.4, or any acute illness.          Dolly was seen today for annual exam.    Diagnoses and all orders for this visit:    Wellness examination  -     Ambulatory referral/consult to Ophthalmology; Future    Flu vaccine need  -     influenza (Flulaval, Fluzone, Fluarix) 45 mcg/0.5 mL IM vaccine (> or = 6 mo) 0.5 mL    Visit for screening mammogram  -     Mammo Digital Screening Bilat w/ Chi; Future  -     Mammo Digital Screening Bilat w/ Chi    Primary hypertension  -     Comprehensive Metabolic Panel; Future  -     CBC Auto Differential; Future    Class 3 severe obesity due to excess calories with serious comorbidity and body mass index (BMI) of 45.0 to 49.9 in adult  -     tirzepatide, weight loss, (ZEPBOUND) 12.5 mg/0.5 mL PnIj; Inject 12.5 mg into the skin every 7 days.    Encounter for screening for cardiovascular disorders  -     CT Calcium Scoring Cardiac; Future    Bacteria in urine  -     Urine Culture High Risk; Future    Insomnia, unspecified type  -     traZODone (DESYREL) 50 MG tablet; Take 1 tablet (50 mg total) by mouth nightly as needed for Insomnia.          Medication List with Changes/Refills   New Medications    TIRZEPATIDE, WEIGHT LOSS, (ZEPBOUND) 12.5 MG/0.5 ML PNIJ    Inject 12.5 mg into the skin every 7 days.        Start Date: 10/4/2024 End Date: 1/2/2025   Current Medications    ALBUTEROL (PROVENTIL/VENTOLIN HFA) 90 MCG/ACTUATION INHALER    Inhale 2 puffs into the lungs every 6 (six) hours as needed.       Start Date: 4/3/2023  End Date: --    ASPIRIN 81 MG CAP    Take 1 tablet by mouth once daily.       Start Date: --        End Date: --    CELECOXIB (CELEBREX) 200 MG CAPSULE    Take 1 capsule (200 mg total) by mouth after lunch.       Start Date: 9/18/2023 End Date: --    CHOLECALCIFEROL, VITAMIN D3, (VITAMIN D3) 50 MCG (2,000 UNIT) TAB    Take 2,000 Units by mouth once daily.       Start Date: --        End Date: --    DICLOFENAC (VOLTAREN) 50 MG EC TABLET    Take 50 mg by mouth 2 (two) times a day.       Start Date: 5/29/2023 End Date: --    DICLOFENAC SODIUM (VOLTAREN) 1 % GEL    Apply 4 g topically 2 (two) times a day.       Start Date: 5/30/2023 End Date: --    FEROSUL 325 MG (65 MG IRON) TAB TABLET    Take 325 mg by mouth every other day.       Start Date: 4/21/2023 End Date: --    FLUTICASONE PROPIONATE (FLONASE) 50 MCG/ACTUATION NASAL SPRAY    1 spray (50 mcg total) by Each Nostril route 2 (two) times a day.       Start Date: 5/8/2023  End Date: --    HYDROCHLOROTHIAZIDE (HYDRODIURIL) 25 MG TABLET    Take 1 tablet (25 mg total) by mouth once daily.       Start Date: 6/10/2024 End Date: 6/10/2025    LISINOPRIL 10 MG TABLET    TAKE 1 TABLET(10 MG) BY MOUTH EVERY MORNING       Start Date: 6/20/2024 End Date: --    MAGNESIUM 30 MG TAB    Take 30 mg by mouth 2 (two) times a day.       Start Date: --        End Date: --    METHYLPREDNISOLONE (MEDROL) 4 MG TAB    Take 4 mg by mouth once daily.       Start Date: 3/18/2024 End Date: --    ONDANSETRON (ZOFRAN-ODT) 4 MG TBDL    Take 1 tablet (4 mg total) by mouth every 8 (eight) hours as needed (nausea).       Start Date: 7/23/2024 End Date: --    SPIRONOLACTONE (ALDACTONE) 50 MG TABLET    Take 1 tablet (50 mg total) by mouth 2 (two) times daily.       Start Date: 7/23/2024  End Date: 7/23/2025   Changed and/or Refilled Medications    Modified Medication Previous Medication    TRAZODONE (DESYREL) 50 MG TABLET traZODone (DESYREL) 50 MG tablet       Take 1 tablet (50 mg total) by mouth nightly as needed for Insomnia.    Take 50 mg by mouth nightly as needed.       Start Date: 10/4/2024 End Date: 10/4/2025    Start Date: --        End Date: 10/4/2024   Discontinued Medications    OMEPRAZOLE (PRILOSEC) 40 MG CAPSULE    Take 1 capsule (40 mg total) by mouth once daily. Before lunch       Start Date: 9/18/2023 End Date: 10/4/2024    TIRZEPATIDE, WEIGHT LOSS, (ZEPBOUND) 10 MG/0.5 ML PNIJ    Inject 10 mg into the skin every 7 days.       Start Date: 8/14/2024 End Date: 10/4/2024          Follow up in about 3 months (around 1/4/2025) for Obesity Followup, Virtual Visit.

## 2024-10-05 LAB — BACTERIA UR CULT: NORMAL

## 2024-10-07 ENCOUNTER — PATIENT MESSAGE (OUTPATIENT)
Dept: FAMILY MEDICINE | Facility: CLINIC | Age: 54
End: 2024-10-07
Payer: COMMERCIAL

## 2024-10-16 RX ORDER — FLUTICASONE PROPIONATE 50 MCG
SPRAY, SUSPENSION (ML) NASAL
Qty: 16 G | Refills: 11 | Status: SHIPPED | OUTPATIENT
Start: 2024-10-16

## 2024-10-18 ENCOUNTER — E-VISIT (OUTPATIENT)
Dept: FAMILY MEDICINE | Facility: CLINIC | Age: 54
End: 2024-10-18
Payer: COMMERCIAL

## 2024-10-18 DIAGNOSIS — N92.0 MENORRHAGIA WITH REGULAR CYCLE: Primary | ICD-10-CM

## 2024-10-18 RX ORDER — TRANEXAMIC ACID 650 MG/1
1300 TABLET ORAL 3 TIMES DAILY
Qty: 30 TABLET | Refills: 0 | Status: SHIPPED | OUTPATIENT
Start: 2024-10-18 | End: 2024-10-23

## 2024-10-18 NOTE — PROGRESS NOTES
Patient ID: Dolly Romero is a 54 y.o. female.    Chief Complaint: General Illness (Entered automatically based on patient selection in [employer].)          274}  The patient initiated a request through [employer] on 10/18/2024 for evaluation and management with a chief complaint of General Illness (Entered automatically based on patient selection in [employer].)     I evaluated the questionnaire submission on 10/18/2024 .    Total Time (in minutes): 12     [facility]    10/18/2024 12:47 PM CDT - Filed by Patient   What do you need help with? Other Concern   Do you agree to participate in an E-Visit? Yes   If you have any of the following symptoms, please present to your local emergency room or call 911:  I acknowledge   Are you pregnant, could you be pregnant, or are you breast feeding? None of the above   What is the main issue you would like addressed today? Heavy Period   Please describe your symptoms I have been having a heavy period for over a week. I was once prescribed Tranxemic to help with that and wanted to see if I couid get that to stop my period.   Where is your problem located? Uterus   How severe are your symptoms? Severe   Have you had these symptoms before? Yes   How long have you been having these symptoms? For a week   Please list any medications or treatments you have used for your condition and indicate if it was effective or not. Tranexamic and it stopped the bleeding.   What makes this feel better? N/A   What makes this feel worse? N/A   Are these symptoms related to a condition that you currently have? Yes   What is the condition? Menorrhagia   When were you last seen for this condition? 4/1/2024   Please describe any probable cause for these symptoms Heavy menstrual cycle   Provide any additional information you feel is important. I was prescribed Tranexamic before for this but my GYN is gone for the day   Please attach any relevant images or files    Are  you able to take your vital signs? No          Active Problem List with Overview Notes    Diagnosis Date Noted    Class 3 severe obesity due to excess calories with serious comorbidity and body mass index (BMI) of 50.0 to 59.9 in adult 02/01/2024    Drug-induced immunodeficiency 10/19/2023    Seropositive rheumatoid arthritis 09/18/2023    Insomnia 09/18/2023    Fibromyalgia syndrome 09/18/2023    Female hirsutism 07/31/2023      Recent Labs Obtained:  Lab Results   Component Value Date    WBC 6.10 10/03/2024    HGB 13.6 10/03/2024    HCT 42.2 10/03/2024    MCV 78.4 (L) 10/03/2024     10/03/2024     10/03/2024    K 3.8 10/03/2024    CREATININE 0.89 10/03/2024    EGFRNORACEVR >60 10/03/2024    HGBA1C 5.2 10/03/2024    TSH 3.132 10/03/2024      Review of patient's allergies indicates:  No Known Allergies    Encounter Diagnosis   Name Primary?    Menorrhagia with regular cycle Yes        No orders of the defined types were placed in this encounter.     Medications Ordered This Encounter   Medications    tranexamic acid (LYSTEDA) 650 mg tablet     Sig: Take 2 tablets (1,300 mg total) by mouth 3 (three) times daily. for 5 days     Dispense:  30 tablet     Refill:  0    Recommend to see gyn for physical exam    E-Visit Time Tracking:    Day 1 Time (in minutes): 12    Total Time (in minutes): 12      274}

## 2024-11-05 ENCOUNTER — PATIENT MESSAGE (OUTPATIENT)
Dept: FAMILY MEDICINE | Facility: CLINIC | Age: 54
End: 2024-11-05
Payer: COMMERCIAL

## 2024-11-05 DIAGNOSIS — E66.01 CLASS 3 SEVERE OBESITY DUE TO EXCESS CALORIES WITH SERIOUS COMORBIDITY AND BODY MASS INDEX (BMI) OF 45.0 TO 49.9 IN ADULT: Primary | ICD-10-CM

## 2024-11-05 DIAGNOSIS — E66.813 CLASS 3 SEVERE OBESITY DUE TO EXCESS CALORIES WITH SERIOUS COMORBIDITY AND BODY MASS INDEX (BMI) OF 45.0 TO 49.9 IN ADULT: Primary | ICD-10-CM

## 2024-11-05 RX ORDER — TIRZEPATIDE 15 MG/.5ML
15 INJECTION, SOLUTION SUBCUTANEOUS
Qty: 2 ML | Refills: 5 | Status: SHIPPED | OUTPATIENT
Start: 2024-11-05 | End: 2025-05-04

## 2024-11-05 RX ORDER — TIRZEPATIDE 15 MG/.5ML
15 INJECTION, SOLUTION SUBCUTANEOUS
Qty: 2 ML | Refills: 5 | Status: SHIPPED | OUTPATIENT
Start: 2024-11-05 | End: 2024-11-05 | Stop reason: SDUPTHER

## 2024-11-08 ENCOUNTER — TELEPHONE (OUTPATIENT)
Dept: FAMILY MEDICINE | Facility: CLINIC | Age: 54
End: 2024-11-08
Payer: COMMERCIAL

## 2024-11-08 ENCOUNTER — DOCUMENTATION ONLY (OUTPATIENT)
Dept: FAMILY MEDICINE | Facility: CLINIC | Age: 54
End: 2024-11-08
Payer: COMMERCIAL

## 2024-11-08 LAB — BCS RECOMMENDATION EXT: NORMAL

## 2024-11-08 NOTE — TELEPHONE ENCOUNTER
----- Message from Iliana Pelletier MD sent at 11/8/2024 12:12 PM CST -----  Mammogram shows benign findings, no mammographic evidence of malignancy, routine Mammogram in 11/2025.

## 2024-12-11 ENCOUNTER — PATIENT MESSAGE (OUTPATIENT)
Dept: FAMILY MEDICINE | Facility: CLINIC | Age: 54
End: 2024-12-11
Payer: COMMERCIAL

## 2024-12-11 DIAGNOSIS — E66.01 CLASS 3 SEVERE OBESITY DUE TO EXCESS CALORIES WITH SERIOUS COMORBIDITY AND BODY MASS INDEX (BMI) OF 45.0 TO 49.9 IN ADULT: Primary | ICD-10-CM

## 2024-12-11 DIAGNOSIS — E66.813 CLASS 3 SEVERE OBESITY DUE TO EXCESS CALORIES WITH SERIOUS COMORBIDITY AND BODY MASS INDEX (BMI) OF 45.0 TO 49.9 IN ADULT: Primary | ICD-10-CM

## 2024-12-11 RX ORDER — TIRZEPATIDE 10 MG/.5ML
10 INJECTION, SOLUTION SUBCUTANEOUS
COMMUNITY
Start: 2024-10-07 | End: 2024-12-11 | Stop reason: SDUPTHER

## 2024-12-11 RX ORDER — TIRZEPATIDE 10 MG/.5ML
10 INJECTION, SOLUTION SUBCUTANEOUS
Qty: 4 PEN | Refills: 0 | Status: SHIPPED | OUTPATIENT
Start: 2024-12-11

## 2025-01-27 ENCOUNTER — OFFICE VISIT (OUTPATIENT)
Dept: FAMILY MEDICINE | Facility: CLINIC | Age: 55
End: 2025-01-27
Payer: COMMERCIAL

## 2025-01-27 VITALS — HEIGHT: 67 IN | BODY MASS INDEX: 42.69 KG/M2 | WEIGHT: 272 LBS

## 2025-01-27 DIAGNOSIS — E66.01 CLASS 3 SEVERE OBESITY DUE TO EXCESS CALORIES WITH SERIOUS COMORBIDITY AND BODY MASS INDEX (BMI) OF 40.0 TO 44.9 IN ADULT: Primary | ICD-10-CM

## 2025-01-27 DIAGNOSIS — E66.813 CLASS 3 SEVERE OBESITY DUE TO EXCESS CALORIES WITH SERIOUS COMORBIDITY AND BODY MASS INDEX (BMI) OF 40.0 TO 44.9 IN ADULT: Primary | ICD-10-CM

## 2025-01-27 RX ORDER — TIRZEPATIDE 5 MG/.5ML
5 INJECTION, SOLUTION SUBCUTANEOUS
Qty: 2 ML | Refills: 11 | Status: SHIPPED | OUTPATIENT
Start: 2025-01-27 | End: 2026-01-27

## 2025-01-27 NOTE — PROGRESS NOTES
Patient ID: [id number]     Chief Complaint: Obesity and Follow-up        HPI:     This is a telemedicine note. Patient was treated using telemedicine, real time audio and video, according to [facility] protocols. IIliana M.D. , conducted the visit from the [facility]. The patient participated in the visit at a non-[facility] location selected by the patient, identified below. I am licensed in the state where the patient stated they are located. The patient stated that they understood and accepted the privacy and security risks to their information at their location. This visit is not recorded.    Patient was located at the patient's home.     Dolly Romero is a 54 y.o. female here today for a telemedicine visit.    - She is morbidly obese and has pre-diabetes and she refuses weight loss surgery, she is awaiting an appointment with a dietician. She has pre-diabetes, she is taking Zepbound 10 mg weekly, she has lost 25 pounds since last visit, she reports mild nausea, no vomiting, does well with Zofran, no side effects, she would like to proceed with decreased dose to Zepbound 5 mg weekly. She denies family history of MEN II or medullary thyroid cancer or personal history of pancreatitis, she is not trying to get pregnant.   - Patient is without any other complaints today.        -------------------------------------    PCOS (polycystic ovarian syndrome)        Past Surgical History:   Procedure Laterality Date    ANGIOGRAM  EXTREMITY BILATERAL Right        Review of patient's allergies indicates:  No Known Allergies    No outpatient medications have been marked as taking for the 1/27/25 encounter (Office Visit) with Iliana Pelletier MD.       Social History     Socioeconomic History    Marital status: Single   Tobacco Use    Smoking status: Never    Smokeless tobacco: Never   Substance and Sexual Activity    Alcohol use: Never    Drug use: Never    Sexual activity: Yes     Partners:  Male     Social Drivers of Health     Financial Resource Strain: Medium Risk (1/31/2024)    Overall Financial Resource Strain (CARDIA)     Difficulty of Paying Living Expenses: Somewhat hard   Food Insecurity: No Food Insecurity (1/31/2024)    Hunger Vital Sign     Worried About Running Out of Food in the Last Year: Never true     Ran Out of Food in the Last Year: Never true   Transportation Needs: No Transportation Needs (1/31/2024)    PRAPARE - Transportation     Lack of Transportation (Medical): No     Lack of Transportation (Non-Medical): No   Physical Activity: Insufficiently Active (1/31/2024)    Exercise Vital Sign     Days of Exercise per Week: 4 days     Minutes of Exercise per Session: 30 min   Stress: Stress Concern Present (1/31/2024)    [employer] [place] [employer] - Occupational Stress Questionnaire     Feeling of Stress : To some extent   Housing Stability: Low Risk  (1/31/2024)    Housing Stability Vital Sign     Unable to Pay for Housing in the Last Year: No     Number of Places Lived in the Last Year: 1     Unstable Housing in the Last Year: No        Family History   Problem Relation Name Age of Onset    Hypertension Mother      Diabetes Mother      Arthritis Mother      Pancreatic cancer Mother      Heart disease Father      Prostate cancer Father          Subjective:       See HPI for details    Constitutional: Denies Change in appetite. Denies Chills. Denies Fever. Denies Night sweats.  Eye: Denies Blurred vision. Denies Discharge. Denies Eye pain.  ENT: Denies Decreased hearing. Denies Sore throat. Denies Swollen glands.  Respiratory: Denies Cough. Denies Shortness of breath. Denies Shortness of breath with exertion. Denies Wheezing.  Cardiovascular: Denies Chest pain at rest. Denies Chest pain with exertion. Denies Irregular heartbeat. Denies Palpitations.  Gastrointestinal: Denies Abdominal pain. Denies Diarrhea. Denies Nausea. Denies Vomiting. Denies Hematemesis or  "Hematochezia.  Genitourinary: Denies Dysuria. Denies Urinary frequency. Denies Urinary urgency. Denies Blood in urine.  Endocrine: Denies Cold intolerance. Denies Excessive thirst. Denies Heat intolerance. Denies Weight loss. Denies Weight gain.  Musculoskeletal: Denies Painful joints. Denies Weakness.  Integumentary: Denies Rash. Denies Itching. Denies Dry skin.  Neurologic: Denies Dizziness. Denies Fainting. Denies Headache.  Psychiatric: Denies Depression. Denies Anxiety. Denies Suicidal/Homicidal ideations.    All Other ROS: Negative except as stated in HPI.   Answers submitted by the patient for this visit:  Review of Systems Questionnaire (Submitted on 1/26/2025)  activity change: No  unexpected weight change: No  neck pain: No  hearing loss: No  rhinorrhea: No  trouble swallowing: No  eye discharge: No  visual disturbance: No  chest tightness: No  wheezing: No  chest pain: No  palpitations: No  blood in stool: No  constipation: No  vomiting: No  diarrhea: No  polydipsia: No  polyuria: No  difficulty urinating: No  hematuria: No  menstrual problem: No  dysuria: No  joint swelling: No  arthralgias: No  headaches: No  weakness: No  confusion: No  dysphoric mood: No      Objective:     Ht 5' 7" (1.702 m)   Wt 123.4 kg (272 lb)   BMI 42.60 kg/m²     Physical Exam    Physical Exam: LIMITED DUE TO TELEMEDICINE RESTRICTIONS.  General: Alert and oriented, No acute distress. Obese.   Head: Normocephalic, Atraumatic.  Eye: Sclera non-icteric.  Neck/Thyroid:  Full range of motion.  Respiratory: Non-labored respirations, Symmetrical chest wall expansion.  Musculoskeletal: Normal range of motion.  Integumentary: Warm, Dry, Intact, No visible suspicious lesions or rashes. No diaphoresis.   Neurologic: No focal deficits  Psychiatric: Normal interaction, Coherent speech, Euthymic mood, Appropriate affect         Assessment:       ICD-10-CM ICD-9-CM   1. Class 3 severe obesity due to excess calories with serious comorbidity " and body mass index (BMI) of 40.0 to 44.9 in adult  E66.813 278.01    E66.01 V85.41    Z68.41         Plan:     Problem List Items Addressed This Visit    None  Visit Diagnoses       Class 3 severe obesity due to excess calories with serious comorbidity and body mass index (BMI) of 40.0 to 44.9 in adult    -  Primary    Relevant Medications    tirzepatide, weight loss, (ZEPBOUND) 5 mg/0.5 mL PnIj         1. Class 3 severe obesity due to excess calories with serious comorbidity and body mass index (BMI) of 40.0 to 44.9 in adult  - tirzepatide, weight loss, (ZEPBOUND) 5 mg/0.5 mL PnIj; Inject 5 mg into the skin every 7 days.  Dispense: 2 mL; Refill: 11  - Diet, exercise, and 10% weight loss encouraged. Rx trial of decreased dose of Zepbound to 5 mg weekly with close monitoring to help with pre-diabetes and obesity. She agrees to avoid pregnancy with Zepbound. Will titrate Rx Zepbound as needed/tolerated until max dose achieved. Recheck CMP, HgA1C with annual wellness labs. Notify M.D. or ER if symptoms persist or worsen, adverse Rx side effects, pancreatitis, temp greater than 100.4, or any acute illness.    Body mass index is 42.6 kg/m².  Goal BMI <30.  Exercise 5 times a week for 30 minutes per day.  Avoid soda, simple sugars, excessive rice, potatoes or bread. Limit fast foods and fried foods.  Choose complex carbs in moderation (example: green vegetables, beans, oatmeal). Eat plenty of fresh fruits and vegetables with lean meats daily.  Do not skip meals. Eat a balanced portion size.  Avoid fad diets. Consider permanent healthy life style changes.        Dolly was seen today for obesity and follow-up.    Diagnoses and all orders for this visit:    Class 3 severe obesity due to excess calories with serious comorbidity and body mass index (BMI) of 40.0 to 44.9 in adult  -     tirzepatide, weight loss, (ZEPBOUND) 5 mg/0.5 mL PnIj; Inject 5 mg into the skin every 7 days.          Medication List with Changes/Refills    New Medications    TIRZEPATIDE, WEIGHT LOSS, (ZEPBOUND) 5 MG/0.5 ML PNIJ    Inject 5 mg into the skin every 7 days.       Start Date: 1/27/2025 End Date: 1/27/2026   Current Medications    ALBUTEROL (PROVENTIL/VENTOLIN HFA) 90 MCG/ACTUATION INHALER    Inhale 2 puffs into the lungs every 6 (six) hours as needed.       Start Date: 4/3/2023  End Date: --    ASPIRIN 81 MG CAP    Take 1 tablet by mouth once daily.       Start Date: --        End Date: --    CELECOXIB (CELEBREX) 200 MG CAPSULE    Take 1 capsule (200 mg total) by mouth after lunch.       Start Date: 9/18/2023 End Date: --    CHOLECALCIFEROL, VITAMIN D3, (VITAMIN D3) 50 MCG (2,000 UNIT) TAB    Take 2,000 Units by mouth once daily.       Start Date: --        End Date: --    DICLOFENAC (VOLTAREN) 50 MG EC TABLET    Take 50 mg by mouth 2 (two) times a day.       Start Date: 5/29/2023 End Date: --    DICLOFENAC SODIUM (VOLTAREN) 1 % GEL    Apply 4 g topically 2 (two) times a day.       Start Date: 5/30/2023 End Date: --    FEROSUL 325 MG (65 MG IRON) TAB TABLET    Take 325 mg by mouth every other day.       Start Date: 4/21/2023 End Date: --    FLUTICASONE PROPIONATE (FLONASE) 50 MCG/ACTUATION NASAL SPRAY    SHAKE LIQUID AND USE 1 SPRAY(50 MCG) IN EACH NOSTRIL TWICE DAILY       Start Date: 10/16/2024End Date: --    HYDROCHLOROTHIAZIDE (HYDRODIURIL) 25 MG TABLET    Take 1 tablet (25 mg total) by mouth once daily.       Start Date: 6/10/2024 End Date: 6/10/2025    LISINOPRIL 10 MG TABLET    TAKE 1 TABLET(10 MG) BY MOUTH EVERY MORNING       Start Date: 6/20/2024 End Date: --    MAGNESIUM 30 MG TAB    Take 30 mg by mouth 2 (two) times a day.       Start Date: --        End Date: --    METHYLPREDNISOLONE (MEDROL) 4 MG TAB    Take 4 mg by mouth once daily.       Start Date: 3/18/2024 End Date: --    ONDANSETRON (ZOFRAN-ODT) 4 MG TBDL    Take 1 tablet (4 mg total) by mouth every 8 (eight) hours as needed (nausea).       Start Date: 7/23/2024 End Date: --     SPIRONOLACTONE (ALDACTONE) 50 MG TABLET    Take 1 tablet (50 mg total) by mouth 2 (two) times daily.       Start Date: 7/23/2024 End Date: 7/23/2025    TRAZODONE (DESYREL) 50 MG TABLET    Take 1 tablet (50 mg total) by mouth nightly as needed for Insomnia.       Start Date: 10/4/2024 End Date: 10/4/2025   Discontinued Medications    TIRZEPATIDE, WEIGHT LOSS, (ZEPBOUND) 15 MG/0.5 ML PNIJ    Inject 15 mg into the skin every 7 days.       Start Date: 11/5/2024 End Date: 1/27/2025    ZEPBOUND 10 MG/0.5 ML PNIJ    Inject 10 mg into the skin every 7 days.       Start Date: 12/11/2024End Date: 1/27/2025          Follow up in about 3 months (around 4/27/2025) for Obesity Followup, In office Followup.      Audio/Video Time Documentation:  Spent 9 minutes for telemedicine visit with successful audio/visual connection. [facility] was used for billing.

## 2025-03-07 ENCOUNTER — LAB VISIT (OUTPATIENT)
Dept: LAB | Facility: HOSPITAL | Age: 55
End: 2025-03-07
Attending: OBSTETRICS & GYNECOLOGY
Payer: COMMERCIAL

## 2025-03-07 DIAGNOSIS — R30.0 DYSURIA: ICD-10-CM

## 2025-03-07 DIAGNOSIS — R10.2 PELVIC PAIN SYNDROME: Primary | ICD-10-CM

## 2025-03-07 LAB
BACTERIA #/AREA URNS AUTO: ABNORMAL /HPF
BILIRUB UR QL STRIP.AUTO: NEGATIVE
CLARITY UR: ABNORMAL
COLOR UR AUTO: ABNORMAL
ESTRADIOL SERPL HS-MCNC: 27 PG/ML
FSH SERPL-ACNC: 13.07 MIU/ML
GLUCOSE UR QL STRIP: NORMAL
HGB UR QL STRIP: NEGATIVE
KETONES UR QL STRIP: NEGATIVE
LEUKOCYTE ESTERASE UR QL STRIP: NEGATIVE
MUCOUS THREADS URNS QL MICRO: ABNORMAL /LPF
NITRITE UR QL STRIP: NEGATIVE
PH UR STRIP: 5.5 [PH]
PROT UR QL STRIP: NEGATIVE
RBC #/AREA URNS AUTO: ABNORMAL /HPF
SP GR UR STRIP.AUTO: 1.02 (ref 1–1.03)
SQUAMOUS #/AREA URNS LPF: ABNORMAL /HPF
TESTOST SERPL-MCNC: 32.19 NG/DL (ref 12.4–35.75)
UROBILINOGEN UR STRIP-ACNC: NORMAL
WBC #/AREA URNS AUTO: ABNORMAL /HPF

## 2025-03-07 PROCEDURE — 83001 ASSAY OF GONADOTROPIN (FSH): CPT

## 2025-03-07 PROCEDURE — 81001 URINALYSIS AUTO W/SCOPE: CPT

## 2025-03-07 PROCEDURE — 82670 ASSAY OF TOTAL ESTRADIOL: CPT

## 2025-03-07 PROCEDURE — 84403 ASSAY OF TOTAL TESTOSTERONE: CPT

## 2025-03-07 PROCEDURE — 36415 COLL VENOUS BLD VENIPUNCTURE: CPT

## 2025-04-09 ENCOUNTER — PATIENT MESSAGE (OUTPATIENT)
Dept: FAMILY MEDICINE | Facility: CLINIC | Age: 55
End: 2025-04-09

## 2025-04-09 ENCOUNTER — E-VISIT (OUTPATIENT)
Dept: FAMILY MEDICINE | Facility: CLINIC | Age: 55
End: 2025-04-09
Payer: COMMERCIAL

## 2025-04-09 VITALS — WEIGHT: 272.88 LBS | BODY MASS INDEX: 42.83 KG/M2 | HEIGHT: 67 IN

## 2025-04-09 DIAGNOSIS — E66.01 CLASS 3 SEVERE OBESITY DUE TO EXCESS CALORIES WITH SERIOUS COMORBIDITY AND BODY MASS INDEX (BMI) OF 40.0 TO 44.9 IN ADULT: Primary | ICD-10-CM

## 2025-04-09 DIAGNOSIS — E66.813 CLASS 3 SEVERE OBESITY DUE TO EXCESS CALORIES WITH SERIOUS COMORBIDITY AND BODY MASS INDEX (BMI) OF 40.0 TO 44.9 IN ADULT: Primary | ICD-10-CM

## 2025-04-09 DIAGNOSIS — Z11.3 SCREENING EXAMINATION FOR STD (SEXUALLY TRANSMITTED DISEASE): Primary | ICD-10-CM

## 2025-04-09 RX ORDER — TIRZEPATIDE 10 MG/.5ML
10 INJECTION, SOLUTION SUBCUTANEOUS
Qty: 6 ML | Refills: 3 | Status: SHIPPED | OUTPATIENT
Start: 2025-04-09 | End: 2026-04-09

## 2025-04-09 NOTE — PROGRESS NOTES
Patient ID: Dolly Romero is a 54 y.o. female.    Chief Complaint: General Illness (Entered automatically based on patient selection in [employer].)    The patient initiated a request through [employer] on 4/9/2025 for evaluation and management with a chief complaint of General Illness (Entered automatically based on patient selection in [employer].)     I evaluated the questionnaire submission on 04/09/2025.    [facility] Evisit [facility]-Medication    4/9/2025 11:19 AM CDT - Filed by Patient   What do you need help with? Medication Request   Do you agree to participate in an E-Visit? Yes   If you have any of the following symptoms, please present to your local emergency room or call 911:  I acknowledge   Medication requests for narcotics will not be addressed via an E-Visit.  Please schedule an appointment. I acknowledge   Do you have any of the following pregnancy-related conditions? None   Do you want to address a new or existing medication? I would like to address a medication I currently take   What is the main issue you would like addressed today? I would like to go back up to Zepbound 10mg because fhe 5mg is no linger working and i am currently using a 10mg that I already had that is working fine. Insurance wont approve the 12.5 and the 15 makes me really sick. I am currently at 272 pounds   Would you like to change or continue your medication? Change medication   What medication would you like changed?  Changed to Zepbound 10mg a 3 month supply sent to [facility]   What is your current dose? Zepbound 5mg   How often do you take your medication? 1 time weekly   Why do you need the medication changed? Cost/insurance issues   Which best describes your cost or insurance problem? I can't afford it    What medical condition is the  medication intended to treat? Weight loss   Provide any additional information you feel is important. My job is letting me go because of company downsizing and I wont have  coverage soon. [facility] will allow me to get a 3 month supply.   Please attach any relevant images or files    Are you able to take your vital signs? Yes   Systolic Blood Pressure: 105   Diastolic Blood Pressure: 84   Weight: 272.9   Height:    Pulse: 83   Temperature:    Respiration rate:    Pulse Oxygen:          Encounter Diagnosis   Name Primary?    Class 3 severe obesity due to excess calories with serious comorbidity and body mass index (BMI) of 40.0 to 44.9 in adult Yes        No orders of the defined types were placed in this encounter.     Medications Ordered This Encounter   Medications    tirzepatide, weight loss, (ZEPBOUND) 10 mg/0.5 mL PnIj     Sig: Inject 10 mg into the skin every 7 days.     Dispense:  6 mL     Refill:  3     Dose increase. Thanks.      I sent a prescription for an increased dose of your Zepbound to 10 mg weekly with close monitoring to help with your weight loss for a 90 day supply. Will titrate Rx Zepbound as needed/tolerated until max dose achieved. Notify M.D. or ER if symptoms persist or worsen, adverse Rx side effects, pancreatitis, biliary colic, temp greater than 100.4, or any acute illness.   Body mass index is 42.74 kg/m².  Goal BMI <30.  Exercise 5 times a week for 30 minutes per day.  Avoid soda, simple sugars, excessive rice, potatoes or bread. Limit fast foods and fried foods.  Choose complex carbs in moderation (example: green vegetables, beans, oatmeal). Eat plenty of fresh fruits and vegetables with lean meats daily.  Do not skip meals. Eat a balanced portion size.  Avoid fad diets. Consider permanent healthy life style changes.      Follow up if symptoms worsen or fail to improve.      E-Visit Time Tracking:    Day 1 Time (in minutes): 5    Total Time (in minutes): 5

## 2025-04-17 DIAGNOSIS — R11.0 NAUSEA: ICD-10-CM

## 2025-04-17 RX ORDER — ONDANSETRON 4 MG/1
TABLET, ORALLY DISINTEGRATING ORAL
Qty: 30 TABLET | Refills: 2 | Status: SHIPPED | OUTPATIENT
Start: 2025-04-17

## 2025-06-22 DIAGNOSIS — I10 PRIMARY HYPERTENSION: ICD-10-CM

## 2025-06-23 ENCOUNTER — PATIENT MESSAGE (OUTPATIENT)
Dept: FAMILY MEDICINE | Facility: CLINIC | Age: 55
End: 2025-06-23
Payer: COMMERCIAL

## 2025-06-23 DIAGNOSIS — I10 PRIMARY HYPERTENSION: ICD-10-CM

## 2025-06-23 DIAGNOSIS — L68.0 FEMALE HIRSUTISM: ICD-10-CM

## 2025-06-23 RX ORDER — HYDROCHLOROTHIAZIDE 25 MG/1
25 TABLET ORAL DAILY
Qty: 90 TABLET | Refills: 3 | Status: SHIPPED | OUTPATIENT
Start: 2025-06-23

## 2025-06-23 RX ORDER — LISINOPRIL 10 MG/1
10 TABLET ORAL EVERY MORNING
Qty: 90 TABLET | Refills: 3 | Status: SHIPPED | OUTPATIENT
Start: 2025-06-23

## 2025-06-23 RX ORDER — HYDROCHLOROTHIAZIDE 25 MG/1
TABLET ORAL
Qty: 90 TABLET | Refills: 3 | Status: SHIPPED | OUTPATIENT
Start: 2025-06-23 | End: 2025-06-23 | Stop reason: SDUPTHER

## 2025-06-23 RX ORDER — LISINOPRIL 10 MG/1
10 TABLET ORAL EVERY MORNING
Qty: 90 TABLET | Refills: 3 | Status: SHIPPED | OUTPATIENT
Start: 2025-06-23 | End: 2025-06-23 | Stop reason: SDUPTHER

## 2025-06-23 RX ORDER — SPIRONOLACTONE 50 MG/1
50 TABLET, FILM COATED ORAL 2 TIMES DAILY
Qty: 180 TABLET | Refills: 3 | Status: SHIPPED | OUTPATIENT
Start: 2025-06-23 | End: 2026-06-23

## 2025-08-12 ENCOUNTER — OFFICE VISIT (OUTPATIENT)
Dept: FAMILY MEDICINE | Facility: CLINIC | Age: 55
End: 2025-08-12
Payer: COMMERCIAL

## 2025-08-12 VITALS
DIASTOLIC BLOOD PRESSURE: 73 MMHG | WEIGHT: 284 LBS | HEIGHT: 67 IN | SYSTOLIC BLOOD PRESSURE: 106 MMHG | BODY MASS INDEX: 44.57 KG/M2

## 2025-08-12 DIAGNOSIS — Z11.3 SCREENING EXAMINATION FOR STD (SEXUALLY TRANSMITTED DISEASE): ICD-10-CM

## 2025-08-12 DIAGNOSIS — G47.33 MODERATE OBSTRUCTIVE SLEEP APNEA: ICD-10-CM

## 2025-08-12 DIAGNOSIS — Z12.31 VISIT FOR SCREENING MAMMOGRAM: ICD-10-CM

## 2025-08-12 DIAGNOSIS — Z12.11 SCREENING FOR COLON CANCER: ICD-10-CM

## 2025-08-12 DIAGNOSIS — E66.813 CLASS 3 SEVERE OBESITY DUE TO EXCESS CALORIES WITH SERIOUS COMORBIDITY AND BODY MASS INDEX (BMI) OF 40.0 TO 44.9 IN ADULT: Primary | ICD-10-CM

## 2025-08-12 RX ORDER — TIRZEPATIDE 7.5 MG/.5ML
7.5 INJECTION, SOLUTION SUBCUTANEOUS
Qty: 2 ML | Refills: 2 | Status: SHIPPED | OUTPATIENT
Start: 2025-08-12 | End: 2025-11-10

## 2025-08-13 ENCOUNTER — PATIENT MESSAGE (OUTPATIENT)
Dept: FAMILY MEDICINE | Facility: CLINIC | Age: 55
End: 2025-08-13
Payer: COMMERCIAL

## 2025-08-22 ENCOUNTER — LAB VISIT (OUTPATIENT)
Dept: LAB | Facility: HOSPITAL | Age: 55
End: 2025-08-22
Attending: FAMILY MEDICINE
Payer: COMMERCIAL

## 2025-08-22 DIAGNOSIS — I10 PRIMARY HYPERTENSION: ICD-10-CM

## 2025-08-22 DIAGNOSIS — Z11.3 SCREENING EXAMINATION FOR STD (SEXUALLY TRANSMITTED DISEASE): ICD-10-CM

## 2025-08-22 LAB
ALBUMIN SERPL-MCNC: 3.4 G/DL (ref 3.5–5)
ALBUMIN/GLOB SERPL: 0.9 RATIO (ref 1.1–2)
ALP SERPL-CCNC: 119 UNIT/L (ref 40–150)
ALT SERPL-CCNC: 9 UNIT/L (ref 0–55)
ANION GAP SERPL CALC-SCNC: 6 MEQ/L
AST SERPL-CCNC: 12 UNIT/L (ref 11–45)
BASOPHILS # BLD AUTO: 0.03 X10(3)/MCL
BASOPHILS NFR BLD AUTO: 0.6 %
BILIRUB SERPL-MCNC: 0.5 MG/DL
BUN SERPL-MCNC: 12.8 MG/DL (ref 9.8–20.1)
C TRACH DNA SPEC QL NAA+PROBE: NOT DETECTED
CALCIUM SERPL-MCNC: 9.3 MG/DL (ref 8.4–10.2)
CHLORIDE SERPL-SCNC: 104 MMOL/L (ref 98–107)
CO2 SERPL-SCNC: 26 MMOL/L (ref 22–29)
CREAT SERPL-MCNC: 0.78 MG/DL (ref 0.55–1.02)
CREAT/UREA NIT SERPL: 16
EOSINOPHIL # BLD AUTO: 0.11 X10(3)/MCL (ref 0–0.9)
EOSINOPHIL NFR BLD AUTO: 2.2 %
ERYTHROCYTE [DISTWIDTH] IN BLOOD BY AUTOMATED COUNT: 14.8 % (ref 11.5–17)
GFR SERPLBLD CREATININE-BSD FMLA CKD-EPI: >60 ML/MIN/1.73/M2
GLOBULIN SER-MCNC: 3.8 GM/DL (ref 2.4–3.5)
GLUCOSE SERPL-MCNC: 100 MG/DL (ref 74–100)
HAV IGM SERPL QL IA: NONREACTIVE
HBV CORE IGM SERPL QL IA: NONREACTIVE
HBV SURFACE AG SERPL QL IA: NONREACTIVE
HCT VFR BLD AUTO: 44.4 % (ref 37–47)
HCV AB SERPL QL IA: NONREACTIVE
HGB BLD-MCNC: 14.2 G/DL (ref 12–16)
HIV 1+2 AB+HIV1 P24 AG SERPL QL IA: NONREACTIVE
IMM GRANULOCYTES # BLD AUTO: 0.01 X10(3)/MCL (ref 0–0.04)
IMM GRANULOCYTES NFR BLD AUTO: 0.2 %
LYMPHOCYTES # BLD AUTO: 1.72 X10(3)/MCL (ref 0.6–4.6)
LYMPHOCYTES NFR BLD AUTO: 34 %
MCH RBC QN AUTO: 26.1 PG (ref 27–31)
MCHC RBC AUTO-ENTMCNC: 32 G/DL (ref 33–36)
MCV RBC AUTO: 81.5 FL (ref 80–94)
MONOCYTES # BLD AUTO: 0.39 X10(3)/MCL (ref 0.1–1.3)
MONOCYTES NFR BLD AUTO: 7.7 %
N GONORRHOEA DNA SPEC QL NAA+PROBE: NOT DETECTED
NEUTROPHILS # BLD AUTO: 2.8 X10(3)/MCL (ref 2.1–9.2)
NEUTROPHILS NFR BLD AUTO: 55.3 %
NRBC BLD AUTO-RTO: 0 %
PLATELET # BLD AUTO: 282 X10(3)/MCL (ref 130–400)
PMV BLD AUTO: 11.5 FL (ref 7.4–10.4)
POTASSIUM SERPL-SCNC: 4.6 MMOL/L (ref 3.5–5.1)
PROT SERPL-MCNC: 7.2 GM/DL (ref 6.4–8.3)
RBC # BLD AUTO: 5.45 X10(6)/MCL (ref 4.2–5.4)
RPR SER QL: NORMAL
SODIUM SERPL-SCNC: 136 MMOL/L (ref 136–145)
SPECIMEN SOURCE: NORMAL
T PALLIDUM AB SER QL: REACTIVE
WBC # BLD AUTO: 5.06 X10(3)/MCL (ref 4.5–11.5)

## 2025-08-22 PROCEDURE — 85025 COMPLETE CBC W/AUTO DIFF WBC: CPT

## 2025-08-22 PROCEDURE — 36415 COLL VENOUS BLD VENIPUNCTURE: CPT

## 2025-08-22 PROCEDURE — 86780 TREPONEMA PALLIDUM: CPT

## 2025-08-22 PROCEDURE — 86780 TREPONEMA PALLIDUM: CPT | Mod: 59

## 2025-08-22 PROCEDURE — 80074 ACUTE HEPATITIS PANEL: CPT

## 2025-08-22 PROCEDURE — 80053 COMPREHEN METABOLIC PANEL: CPT

## 2025-08-22 PROCEDURE — 86696 HERPES SIMPLEX TYPE 2 TEST: CPT

## 2025-08-22 PROCEDURE — 87491 CHLMYD TRACH DNA AMP PROBE: CPT

## 2025-08-22 PROCEDURE — 86592 SYPHILIS TEST NON-TREP QUAL: CPT

## 2025-08-22 PROCEDURE — 87661 TRICHOMONAS VAGINALIS AMPLIF: CPT

## 2025-08-22 PROCEDURE — 87389 HIV-1 AG W/HIV-1&-2 AB AG IA: CPT

## 2025-08-23 LAB — PATH REV: NORMAL

## 2025-08-24 LAB
SPECIMEN SOURCE: NORMAL
T VAGINALIS RRNA SPEC QL NAA+PROBE: NEGATIVE

## 2025-08-25 ENCOUNTER — PATIENT MESSAGE (OUTPATIENT)
Dept: FAMILY MEDICINE | Facility: CLINIC | Age: 55
End: 2025-08-25
Payer: COMMERCIAL

## 2025-08-25 LAB
HSV1 IGG SERPL QL IA: NEGATIVE
HSV2 IGG SERPL QL IA: POSITIVE

## 2025-08-26 LAB — T PALLIDUM AB SER QL AGGL: NEGATIVE

## 2025-08-27 ENCOUNTER — PATIENT MESSAGE (OUTPATIENT)
Dept: FAMILY MEDICINE | Facility: CLINIC | Age: 55
End: 2025-08-27
Payer: COMMERCIAL